# Patient Record
Sex: FEMALE | Race: WHITE | Employment: PART TIME | URBAN - NONMETROPOLITAN AREA
[De-identification: names, ages, dates, MRNs, and addresses within clinical notes are randomized per-mention and may not be internally consistent; named-entity substitution may affect disease eponyms.]

---
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
## 2022-01-27 RX ORDER — NORETHINDRONE ACETATE AND ETHINYL ESTRADIOL 1; .02 MG/1; MG/1
1 TABLET ORAL DAILY
Qty: 28 TABLET | Refills: 3 | Status: SHIPPED | OUTPATIENT
Start: 2022-01-27

## 2022-05-16 ENCOUNTER — OFFICE VISIT (OUTPATIENT)
Age: 22
End: 2022-05-16
Payer: MEDICAID

## 2022-05-16 DIAGNOSIS — Z11.1 ENCOUNTER FOR PPD TEST: Primary | ICD-10-CM

## 2022-05-16 PROCEDURE — 99211 OFF/OP EST MAY X REQ PHY/QHP: CPT | Performed by: NURSE PRACTITIONER

## 2022-05-16 PROCEDURE — G8428 CUR MEDS NOT DOCUMENT: HCPCS | Performed by: NURSE PRACTITIONER

## 2022-05-16 PROCEDURE — 86580 TB INTRADERMAL TEST: CPT | Performed by: NURSE PRACTITIONER

## 2022-05-16 PROCEDURE — G8420 CALC BMI NORM PARAMETERS: HCPCS | Performed by: NURSE PRACTITIONER

## 2022-06-08 ENCOUNTER — OFFICE VISIT (OUTPATIENT)
Age: 22
End: 2022-06-08
Payer: MEDICAID

## 2022-06-08 DIAGNOSIS — Z11.1 ENCOUNTER FOR PPD TEST: Primary | ICD-10-CM

## 2022-06-08 PROCEDURE — 99211 OFF/OP EST MAY X REQ PHY/QHP: CPT

## 2022-06-08 PROCEDURE — 86580 TB INTRADERMAL TEST: CPT

## 2022-06-08 NOTE — PROGRESS NOTES
TB skin test administered to LFA.   Pt instructed to return in 48-72 hours to have it read, verbalized understanding

## 2022-06-28 RX ORDER — DROSPIRENONE AND ETHINYL ESTRADIOL 0.02-3(28)
1 KIT ORAL DAILY
Qty: 1 PACKET | Refills: 5 | Status: SHIPPED | OUTPATIENT
Start: 2022-06-28

## 2022-12-12 RX ORDER — DROSPIRENONE AND ETHINYL ESTRADIOL 0.02-3(28)
KIT ORAL
Qty: 28 TABLET | Refills: 0 | OUTPATIENT
Start: 2022-12-12

## 2022-12-13 ENCOUNTER — PATIENT MESSAGE (OUTPATIENT)
Dept: OBGYN CLINIC | Age: 22
End: 2022-12-13

## 2022-12-13 RX ORDER — DROSPIRENONE AND ETHINYL ESTRADIOL 0.02-3(28)
1 KIT ORAL DAILY
Qty: 1 PACKET | Refills: 3 | Status: SHIPPED | OUTPATIENT
Start: 2022-12-13

## 2022-12-13 RX ORDER — DROSPIRENONE AND ETHINYL ESTRADIOL 0.02-3(28)
KIT ORAL
Qty: 28 TABLET | Refills: 10 | OUTPATIENT
Start: 2022-12-13

## 2022-12-13 NOTE — TELEPHONE ENCOUNTER
From: Catalino Mills  To: Dr. Chantel Geiger  Sent: 12/13/2022 10:51 AM CST  Subject: Birth Control    I am not for sure if you have gotten a request from the pharmacy, but I am out of my birth control. They told me I have no more refills and I am completely out and dont want to miss a pill.

## 2023-03-13 NOTE — PROGRESS NOTES
Meredith Mills (:  2000) is a Established patient, here for evaluation of the following:  Medication refill for RYDER. She states her periods are not heavy but are lasting longer than they should. Pt states she is having situational anxiety, has taken Zoloft and Celexa, did not like the way it made her feel. Pt states her heart races with anxiety especially when she has to get up in front of her class. Assessment & Plan   Below is the assessment and plan developed based on review of pertinent history, physical exam, labs, studies, and medications. 1. Encounter for medication refill  No follow-ups on file.        Subjective   HPI  Review of Systems       Objective   Patient-Reported Vitals  No data recorded     Physical Exam  [INSTRUCTIONS:  \"[x]\" Indicates a positive item  \"[]\" Indicates a negative item  -- DELETE ALL ITEMS NOT EXAMINED]    Constitutional: [x] Appears well-developed and well-nourished [x] No apparent distress      [] Abnormal -     Mental status: [x] Alert and awake  [x] Oriented to person/place/time [x] Able to follow commands    [] Abnormal -     Eyes:   EOM    [x]  Normal    [] Abnormal -   Sclera  [x]  Normal    [] Abnormal -          Discharge [x]  None visible   [] Abnormal -     HENT: [x] Normocephalic, atraumatic  [] Abnormal -   [x] Mouth/Throat: Mucous membranes are moist    External Ears [x] Normal  [] Abnormal -    Neck: [x] No visualized mass [] Abnormal -     Pulmonary/Chest: [x] Respiratory effort normal   [x] No visualized signs of difficulty breathing or respiratory distress        [] Abnormal -      Musculoskeletal:   [x] Normal gait with no signs of ataxia         [x] Normal range of motion of neck        [] Abnormal -     Neurological:        [x] No Facial Asymmetry (Cranial nerve 7 motor function) (limited exam due to video visit)          [x] No gaze palsy        [] Abnormal -          Skin:        [x] No significant exanthematous lesions or discoloration noted on facial skin         [] Abnormal -            Psychiatric:       [x] Normal Affect [] Abnormal -        [x] No Hallucinations    Other pertinent observable physical exam findings:-         On this date 3/14/2023 I have spent 20 minutes reviewing previous notes, test results and face to face (virtual) with the patient discussing the diagnosis and importance of compliance with the treatment plan as well as documenting on the day of the visit. Meredith MIKEY Mills, was evaluated through a synchronous (real-time) audio-video encounter. The patient (or guardian if applicable) is aware that this is a billable service, which includes applicable co-pays. This Virtual Visit was conducted with patient's (and/or legal guardian's) consent. The visit was conducted pursuant to the emergency declaration under the [address] [place] [address] authority and the [employer] General Act. Patient identification was verified, and a caregiver was present when appropriate. The patient was located at Home: [facility]  Provider was located at [place] [id number]): [address] [place] [address]     Plan:  Change OCP to [facility]  Propanolol 10 mg daily for situational anxiety  3. RTC yearly     I, Mere Hernandez RN, am scribing for and in the presence of Dr. Bee Cleary.

## 2023-03-14 ENCOUNTER — TELEMEDICINE (OUTPATIENT)
Dept: OBGYN CLINIC | Age: 23
End: 2023-03-14
Payer: MEDICAID

## 2023-03-14 DIAGNOSIS — Z76.0 ENCOUNTER FOR MEDICATION REFILL: Primary | ICD-10-CM

## 2023-03-14 DIAGNOSIS — Z30.41 ENCOUNTER FOR BIRTH CONTROL PILLS MAINTENANCE: ICD-10-CM

## 2023-03-14 DIAGNOSIS — F41.8 SITUATIONAL ANXIETY: ICD-10-CM

## 2023-03-14 PROCEDURE — 99213 OFFICE O/P EST LOW 20 MIN: CPT | Performed by: OBSTETRICS & GYNECOLOGY

## 2023-03-14 PROCEDURE — G8421 BMI NOT CALCULATED: HCPCS | Performed by: OBSTETRICS & GYNECOLOGY

## 2023-03-14 PROCEDURE — 1036F TOBACCO NON-USER: CPT | Performed by: OBSTETRICS & GYNECOLOGY

## 2023-03-14 PROCEDURE — G8427 DOCREV CUR MEDS BY ELIG CLIN: HCPCS | Performed by: OBSTETRICS & GYNECOLOGY

## 2023-03-14 PROCEDURE — G8484 FLU IMMUNIZE NO ADMIN: HCPCS | Performed by: OBSTETRICS & GYNECOLOGY

## 2023-03-14 RX ORDER — PROPRANOLOL HYDROCHLORIDE 10 MG/1
10 TABLET ORAL DAILY PRN
Qty: 90 TABLET | Refills: 3 | Status: SHIPPED | OUTPATIENT
Start: 2023-03-14

## 2023-03-14 RX ORDER — NORGESTIMATE AND ETHINYL ESTRADIOL 7DAYSX3 28
1 KIT ORAL DAILY
Qty: 1 PACKET | Refills: 11 | Status: SHIPPED | OUTPATIENT
Start: 2023-03-14

## 2023-03-14 NOTE — PROGRESS NOTES
Pt is wanting to talk about celexa or zoloft-being put back on one of those. Her sex drive is gone now. She wonders if this is caused by birth control? She has really bad test anxiety. She is in the nursing program now. She needs to  get a refill on her birth control.

## 2023-04-07 ENCOUNTER — APPOINTMENT (OUTPATIENT)
Dept: CT IMAGING | Age: 23
End: 2023-04-07
Payer: MEDICAID

## 2023-04-07 ENCOUNTER — HOSPITAL ENCOUNTER (EMERGENCY)
Age: 23
Discharge: HOME | End: 2023-04-07
Payer: MEDICAID

## 2023-04-07 VITALS
TEMPERATURE: 97.6 F | HEIGHT: 69 IN | WEIGHT: 140 LBS | BODY MASS INDEX: 20.73 KG/M2 | DIASTOLIC BLOOD PRESSURE: 86 MMHG | HEART RATE: 108 BPM | SYSTOLIC BLOOD PRESSURE: 130 MMHG | RESPIRATION RATE: 18 BRPM | OXYGEN SATURATION: 100 %

## 2023-04-07 DIAGNOSIS — B34.9 VIRAL SYNDROME: Primary | ICD-10-CM

## 2023-04-07 DIAGNOSIS — R11.2 NAUSEA AND VOMITING, UNSPECIFIED VOMITING TYPE: ICD-10-CM

## 2023-04-07 DIAGNOSIS — N94.6 CRAMPY PAIN ASSOCIATED WITH MENSES: ICD-10-CM

## 2023-04-07 LAB
ALBUMIN SERPL-MCNC: 3.9 G/DL (ref 3.5–5.2)
ALP SERPL-CCNC: 73 U/L (ref 35–104)
ALT SERPL-CCNC: 21 U/L (ref 5–33)
ANION GAP SERPL CALCULATED.3IONS-SCNC: 11 MMOL/L (ref 7–19)
AST SERPL-CCNC: 17 U/L (ref 5–32)
B PARAP IS1001 DNA NPH QL NAA+NON-PROBE: NOT DETECTED
B PERT.PT PRMT NPH QL NAA+NON-PROBE: NOT DETECTED
BACTERIA URNS QL MICRO: NEGATIVE /HPF
BASOPHILS # BLD: 0 K/UL (ref 0–0.2)
BASOPHILS NFR BLD: 0.5 % (ref 0–1)
BILIRUB SERPL-MCNC: 0.3 MG/DL (ref 0.2–1.2)
BILIRUB UR QL STRIP: NEGATIVE
BUN SERPL-MCNC: 10 MG/DL (ref 6–20)
C PNEUM DNA NPH QL NAA+NON-PROBE: NOT DETECTED
CALCIUM SERPL-MCNC: 8.8 MG/DL (ref 8.6–10)
CHLORIDE SERPL-SCNC: 105 MMOL/L (ref 98–111)
CLARITY UR: CLEAR
CO2 SERPL-SCNC: 25 MMOL/L (ref 22–29)
COLOR UR: YELLOW
CREAT SERPL-MCNC: 0.5 MG/DL (ref 0.5–0.9)
CRYSTALS URNS MICRO: ABNORMAL /HPF
EOSINOPHIL # BLD: 0.1 K/UL (ref 0–0.6)
EOSINOPHIL NFR BLD: 3.2 % (ref 0–5)
EPI CELLS #/AREA URNS AUTO: 3 /HPF (ref 0–5)
ERYTHROCYTE [DISTWIDTH] IN BLOOD BY AUTOMATED COUNT: 12.4 % (ref 11.5–14.5)
FLUAV RNA NPH QL NAA+NON-PROBE: NOT DETECTED
FLUBV RNA NPH QL NAA+NON-PROBE: NOT DETECTED
GLUCOSE SERPL-MCNC: 93 MG/DL (ref 74–109)
GLUCOSE UR STRIP.AUTO-MCNC: NEGATIVE MG/DL
HADV DNA NPH QL NAA+NON-PROBE: NOT DETECTED
HCG SERPL QL: NEGATIVE
HCOV 229E RNA NPH QL NAA+NON-PROBE: NOT DETECTED
HCOV HKU1 RNA NPH QL NAA+NON-PROBE: NOT DETECTED
HCOV NL63 RNA NPH QL NAA+NON-PROBE: NOT DETECTED
HCOV OC43 RNA NPH QL NAA+NON-PROBE: NOT DETECTED
HCT VFR BLD AUTO: 41.1 % (ref 37–47)
HGB BLD-MCNC: 13.2 G/DL (ref 12–16)
HGB UR STRIP.AUTO-MCNC: ABNORMAL MG/L
HMPV RNA NPH QL NAA+NON-PROBE: NOT DETECTED
HPIV1 RNA NPH QL NAA+NON-PROBE: NOT DETECTED
HPIV2 RNA NPH QL NAA+NON-PROBE: NOT DETECTED
HPIV3 RNA NPH QL NAA+NON-PROBE: NOT DETECTED
HPIV4 RNA NPH QL NAA+NON-PROBE: NOT DETECTED
HYALINE CASTS #/AREA URNS AUTO: 6 /HPF (ref 0–8)
IMM GRANULOCYTES # BLD: 0 K/UL
KETONES UR STRIP.AUTO-MCNC: ABNORMAL MG/DL
LEUKOCYTE ESTERASE UR QL STRIP.AUTO: NEGATIVE
LIPASE SERPL-CCNC: 19 U/L (ref 13–60)
LYMPHOCYTES # BLD: 1.4 K/UL (ref 1.1–4.5)
LYMPHOCYTES NFR BLD: 37.5 % (ref 20–40)
M PNEUMO DNA NPH QL NAA+NON-PROBE: NOT DETECTED
MCH RBC QN AUTO: 28.9 PG (ref 27–31)
MCHC RBC AUTO-ENTMCNC: 32.1 G/DL (ref 33–37)
MCV RBC AUTO: 90.1 FL (ref 81–99)
MONOCYTES # BLD: 0.4 K/UL (ref 0–0.9)
MONOCYTES NFR BLD: 11.9 % (ref 0–10)
NEUTROPHILS # BLD: 1.7 K/UL (ref 1.5–7.5)
NEUTS SEG NFR BLD: 46.6 % (ref 50–65)
NITRITE UR QL STRIP.AUTO: NEGATIVE
PH UR STRIP.AUTO: 6 [PH] (ref 5–8)
PLATELET # BLD AUTO: 265 K/UL (ref 130–400)
PMV BLD AUTO: 9.5 FL (ref 9.4–12.3)
POTASSIUM SERPL-SCNC: 3.8 MMOL/L (ref 3.5–5)
PROT SERPL-MCNC: 7.1 G/DL (ref 6.6–8.7)
PROT UR STRIP.AUTO-MCNC: ABNORMAL MG/DL
RBC # BLD AUTO: 4.56 M/UL (ref 4.2–5.4)
RBC #/AREA URNS AUTO: 131 /HPF (ref 0–4)
RSV RNA NPH QL NAA+NON-PROBE: NOT DETECTED
RV+EV RNA NPH QL NAA+NON-PROBE: NOT DETECTED
SARS-COV-2 RNA NPH QL NAA+NON-PROBE: NOT DETECTED
SODIUM SERPL-SCNC: 141 MMOL/L (ref 136–145)
SP GR UR STRIP.AUTO: 1.03 (ref 1–1.03)
UROBILINOGEN UR STRIP.AUTO-MCNC: 1 E.U./DL
WBC # BLD AUTO: 3.7 K/UL (ref 4.8–10.8)
WBC #/AREA URNS AUTO: 2 /HPF (ref 0–5)

## 2023-04-07 PROCEDURE — 83690 ASSAY OF LIPASE: CPT

## 2023-04-07 PROCEDURE — 84703 CHORIONIC GONADOTROPIN ASSAY: CPT

## 2023-04-07 PROCEDURE — 0202U NFCT DS 22 TRGT SARS-COV-2: CPT

## 2023-04-07 PROCEDURE — 85025 COMPLETE CBC W/AUTO DIFF WBC: CPT

## 2023-04-07 PROCEDURE — 74176 CT ABD & PELVIS W/O CONTRAST: CPT

## 2023-04-07 PROCEDURE — 96374 THER/PROPH/DIAG INJ IV PUSH: CPT

## 2023-04-07 PROCEDURE — 80053 COMPREHEN METABOLIC PANEL: CPT

## 2023-04-07 PROCEDURE — 6360000002 HC RX W HCPCS: Performed by: PHYSICIAN ASSISTANT

## 2023-04-07 PROCEDURE — 99284 EMERGENCY DEPT VISIT MOD MDM: CPT

## 2023-04-07 PROCEDURE — 81001 URINALYSIS AUTO W/SCOPE: CPT

## 2023-04-07 PROCEDURE — 2580000003 HC RX 258: Performed by: PHYSICIAN ASSISTANT

## 2023-04-07 PROCEDURE — 36415 COLL VENOUS BLD VENIPUNCTURE: CPT

## 2023-04-07 RX ORDER — MORPHINE SULFATE 2 MG/ML
2 INJECTION, SOLUTION INTRAMUSCULAR; INTRAVENOUS ONCE
Status: DISCONTINUED | OUTPATIENT
Start: 2023-04-07 | End: 2023-04-07

## 2023-04-07 RX ORDER — KETOROLAC TROMETHAMINE 30 MG/ML
15 INJECTION, SOLUTION INTRAMUSCULAR; INTRAVENOUS ONCE
Status: DISCONTINUED | OUTPATIENT
Start: 2023-04-07 | End: 2023-04-07

## 2023-04-07 RX ORDER — 0.9 % SODIUM CHLORIDE 0.9 %
500 INTRAVENOUS SOLUTION INTRAVENOUS ONCE
Status: COMPLETED | OUTPATIENT
Start: 2023-04-07 | End: 2023-04-07

## 2023-04-07 RX ORDER — METOCLOPRAMIDE HYDROCHLORIDE 5 MG/ML
10 INJECTION INTRAMUSCULAR; INTRAVENOUS ONCE
Status: COMPLETED | OUTPATIENT
Start: 2023-04-07 | End: 2023-04-07

## 2023-04-07 RX ORDER — ONDANSETRON 4 MG/1
4 TABLET, FILM COATED ORAL EVERY 8 HOURS PRN
Qty: 20 TABLET | Refills: 0 | Status: SHIPPED | OUTPATIENT
Start: 2023-04-07

## 2023-04-07 RX ADMIN — SODIUM CHLORIDE 500 ML: 9 INJECTION, SOLUTION INTRAVENOUS at 09:56

## 2023-04-07 RX ADMIN — METOCLOPRAMIDE 10 MG: 5 INJECTION, SOLUTION INTRAMUSCULAR; INTRAVENOUS at 09:56

## 2023-04-07 ASSESSMENT — ENCOUNTER SYMPTOMS
SHORTNESS OF BREATH: 0
COLOR CHANGE: 0
NAUSEA: 1
DIARRHEA: 1
EYE DISCHARGE: 0
VOMITING: 1
SORE THROAT: 0
ABDOMINAL PAIN: 1
COUGH: 0
ABDOMINAL DISTENTION: 0
BACK PAIN: 0
PHOTOPHOBIA: 0
APNEA: 0
EYE PAIN: 0
RHINORRHEA: 0

## 2023-04-07 NOTE — ED PROVIDER NOTES
forlevel 4, 8 or more for level 5)     ED Triage Vitals [04/07/23 0917]   BP Temp Temp src Heart Rate Resp SpO2 Height Weight   130/86 97.6 °F (36.4 °C) -- (!) 108 18 100 % 5' 9\" (1.753 m) 140 lb (63.5 kg)       Physical Exam  Vitals and nursing note reviewed. Constitutional:       General: She is not in acute distress. Appearance: She is well-developed. She is not diaphoretic. HENT:      Head: Normocephalic and atraumatic. Right Ear: External ear normal.      Left Ear: External ear normal.      Mouth/Throat:      Pharynx: No oropharyngeal exudate. Eyes:      General:         Right eye: No discharge. Left eye: No discharge. Pupils: Pupils are equal, round, and reactive to light. Neck:      Thyroid: No thyromegaly. Cardiovascular:      Rate and Rhythm: Normal rate and regular rhythm. Heart sounds: Normal heart sounds. No murmur heard. No friction rub. Pulmonary:      Effort: Pulmonary effort is normal. No respiratory distress. Breath sounds: Normal breath sounds. No stridor. No wheezing. Abdominal:      General: Bowel sounds are normal. There is no distension. Palpations: Abdomen is soft. Tenderness: There is abdominal tenderness in the suprapubic area. Musculoskeletal:         General: Normal range of motion. Cervical back: Normal range of motion and neck supple. Skin:     General: Skin is warm and dry. Capillary Refill: Capillary refill takes less than 2 seconds. Findings: No rash. Neurological:      Mental Status: She is alert and oriented to person, place, and time. Cranial Nerves: No cranial nerve deficit. Sensory: No sensory deficit. Coordination: Coordination normal.   Psychiatric:         Behavior: Behavior normal.         Thought Content:  Thought content normal.         DIAGNOSTIC RESULTS     RADIOLOGY:   Non-plain film images such as CT, Ultrasound and MRI are read by the radiologist. Plain radiographic images are

## 2023-04-07 NOTE — Clinical Note
Chuy Epperson was seen and treated in our emergency department on 4/7/2023. She may return to school on 04/10/2023. If you have any questions or concerns, please don't hesitate to call.       DG Hirsch

## 2023-07-28 ENCOUNTER — INITIAL PRENATAL (OUTPATIENT)
Dept: OBSTETRICS AND GYNECOLOGY | Facility: CLINIC | Age: 23
End: 2023-07-28
Payer: COMMERCIAL

## 2023-07-28 VITALS — BODY MASS INDEX: 23.04 KG/M2 | SYSTOLIC BLOOD PRESSURE: 94 MMHG | DIASTOLIC BLOOD PRESSURE: 64 MMHG | WEIGHT: 156 LBS

## 2023-07-28 DIAGNOSIS — O09.31 INSUFFICIENT PRENATAL CARE IN FIRST TRIMESTER: Primary | ICD-10-CM

## 2023-07-31 ENCOUNTER — REFERRAL TRIAGE (OUTPATIENT)
Dept: LABOR AND DELIVERY | Facility: HOSPITAL | Age: 23
End: 2023-07-31
Payer: COMMERCIAL

## 2023-08-02 ENCOUNTER — PATIENT MESSAGE (OUTPATIENT)
Dept: OBSTETRICS AND GYNECOLOGY | Facility: CLINIC | Age: 23
End: 2023-08-02
Payer: COMMERCIAL

## 2023-08-04 LAB
ABO GROUP BLD: NORMAL
BACTERIA UR CULT: NO GROWTH
BACTERIA UR CULT: NORMAL
BASOPHILS # BLD AUTO: 0.07 10*3/MM3 (ref 0–0.2)
BASOPHILS NFR BLD AUTO: 0.7 % (ref 0–1.5)
BLD GP AB SCN SERPL QL: NEGATIVE
C TRACH RRNA SPEC QL NAA+PROBE: NEGATIVE
DRUGS UR: NORMAL
EOSINOPHIL # BLD AUTO: 0.13 10*3/MM3 (ref 0–0.4)
EOSINOPHIL NFR BLD AUTO: 1.3 % (ref 0.3–6.2)
ERYTHROCYTE [DISTWIDTH] IN BLOOD BY AUTOMATED COUNT: 12.2 % (ref 12.3–15.4)
HBV SURFACE AG SERPL QL IA: NEGATIVE
HCT VFR BLD AUTO: 36.6 % (ref 34–46.6)
HCV IGG SERPL QL IA: NON REACTIVE
HGB BLD-MCNC: 12.2 G/DL (ref 12–15.9)
HIV 1+2 AB+HIV1 P24 AG SERPL QL IA: NON REACTIVE
IMM GRANULOCYTES # BLD AUTO: 0.03 10*3/MM3 (ref 0–0.05)
IMM GRANULOCYTES NFR BLD AUTO: 0.3 % (ref 0–0.5)
LYMPHOCYTES # BLD AUTO: 3.3 10*3/MM3 (ref 0.7–3.1)
LYMPHOCYTES NFR BLD AUTO: 33.7 % (ref 19.6–45.3)
MCH RBC QN AUTO: 28.2 PG (ref 26.6–33)
MCHC RBC AUTO-ENTMCNC: 33.3 G/DL (ref 31.5–35.7)
MCV RBC AUTO: 84.7 FL (ref 79–97)
MONOCYTES # BLD AUTO: 0.74 10*3/MM3 (ref 0.1–0.9)
MONOCYTES NFR BLD AUTO: 7.6 % (ref 5–12)
N GONORRHOEA RRNA SPEC QL NAA+PROBE: NEGATIVE
NEUTROPHILS # BLD AUTO: 5.51 10*3/MM3 (ref 1.7–7)
NEUTROPHILS NFR BLD AUTO: 56.4 % (ref 42.7–76)
NRBC BLD AUTO-RTO: 0 /100 WBC (ref 0–0.2)
PLATELET # BLD AUTO: 325 10*3/MM3 (ref 140–450)
RBC # BLD AUTO: 4.32 10*6/MM3 (ref 3.77–5.28)
RH BLD: POSITIVE
RPR SER QL: NON REACTIVE
RUBV IGG SERPL IA-ACNC: 3.43 INDEX
WBC # BLD AUTO: 9.78 10*3/MM3 (ref 3.4–10.8)

## 2023-08-22 ENCOUNTER — ROUTINE PRENATAL (OUTPATIENT)
Dept: OBSTETRICS AND GYNECOLOGY | Facility: CLINIC | Age: 23
End: 2023-08-22
Payer: COMMERCIAL

## 2023-08-22 VITALS — DIASTOLIC BLOOD PRESSURE: 60 MMHG | SYSTOLIC BLOOD PRESSURE: 100 MMHG | WEIGHT: 148 LBS | BODY MASS INDEX: 21.86 KG/M2

## 2023-08-22 DIAGNOSIS — Z3A.10 10 WEEKS GESTATION OF PREGNANCY: Primary | ICD-10-CM

## 2023-08-22 LAB
GLUCOSE UR STRIP-MCNC: NEGATIVE MG/DL
PROT UR STRIP-MCNC: NEGATIVE MG/DL

## 2023-08-22 RX ORDER — ONDANSETRON 4 MG/1
4 TABLET, FILM COATED ORAL EVERY 8 HOURS PRN
Qty: 30 TABLET | Refills: 3 | Status: SHIPPED | OUTPATIENT
Start: 2023-08-22

## 2023-08-22 RX ORDER — ONDANSETRON 4 MG/1
4 TABLET, FILM COATED ORAL EVERY 8 HOURS PRN
COMMUNITY
End: 2023-08-22 | Stop reason: SDUPTHER

## 2023-08-22 NOTE — PROGRESS NOTES
"Patient feeling \"ok\", but reports dizzy spells which often occur when she stands up.  Encouraged more water and trying to get in some protein (which she has not really been getting).  Patient reports zofran is most-helpful, and would like a refill.  Mild cramping, but no VB  Patient also reports she is constipated, but will not take stool-softeners.  Discussed water again and advised her that zofran is contributing.  Problem will get better when no longer needing zofran.    "

## 2023-09-15 ENCOUNTER — PATIENT OUTREACH (OUTPATIENT)
Dept: LABOR AND DELIVERY | Facility: HOSPITAL | Age: 23
End: 2023-09-15
Payer: COMMERCIAL

## 2023-09-15 NOTE — OUTREACH NOTE
[facility]  Enrollment    Current Estimated Gestational Age: 13w5d    Questions/Answers      Flowsheet Row Responses   Would like to participate? Yes   Date of Intake Visit 09/15/23            [facility]  Intake    Current Estimated Gestational Age: 13w5d    Intake Assessment      Flowsheet Row Responses   Best Method for Contacting Cell   Currently Employed Yes   Able to keep appointments as scheduled Yes   Do you have a dentist? No  [Dental Clinics provided in resource letter]   Resources Presently Utilizing: None   Maternal Warning Signs Provided  [attached to resource letter]   Other: Provided  [in AVS: second trimester of pregnancy and round ligament pain]   Other Education HANDS, How to find a dentist, How to find a pediatrician, How to find a primary care provider, Insurance benefits/Incentives, Mental Health Services, Smoking/Vaping Cessation, SNAP Benefits, Substance Use Disorder Treatment, Transportation Assistance, WIC Benefits            Learning Assessment      Flowsheet Row Responses   Relationship Patient   Does the learner have any barriers to learning? No Barriers   What is the preferred language of the learner for medical teaching? English   How does the learner prefer to learn new concepts? Listening, Reading, Demonstration, Pictures/Video            SDOH updated and reviewed with the patient during this program:  Financial Resource Strain: Low Risk     Difficulty of Paying Living Expenses: Not very hard      Physical Activity: Sufficiently Active    Days of Exercise per Week: 5 days    Minutes of Exercise per Session: 30 min      Food Insecurity: Food Insecurity Present    Worried About Running Out of Food in the Last Year: Sometimes true    Ran Out of Food in the Last Year: Sometimes true      Social Connections: Not on file      Transportation Needs: No Transportation Needs    Lack of Transportation (Medical): No    Lack of Transportation (Non-Medical): No      Housing  Stability: Not on file      Stress: Not on file    Resource letter and maternal warning signs sent to client in [facility].      Referral submitted to the following resources (verbal consent received to submit demographic information):     HANDS    Tobacco, Alcohol, and Drug History     reports that she has never smoked. She has never used smokeless tobacco.   reports no history of alcohol use.   has no history on file for drug use.    Adali Lopes RN  Maternity Nurse Navigator    9/15/2023, 13:31 CDT            Adali Lopes RN  Maternity Nurse Navigator    9/15/2023, 13:31 CDT

## 2023-09-15 NOTE — OUTREACH NOTE
[facility]  Unable to Reach       Questions/Answers      Flowsheet Row Responses   Pending Outreach Confirm Patient Interest   Call Attempt First   Outcome Not available   Unable to reach comments: Client asked for me to call her back at 1 PM today                Adali Lopes RN  Maternity Nurse Navigator    9/15/2023, 10:31 CDT

## 2023-09-19 DIAGNOSIS — F41.9 ANXIETY: Primary | ICD-10-CM

## 2023-09-19 RX ORDER — BUSPIRONE HYDROCHLORIDE 5 MG/1
5 TABLET ORAL 3 TIMES DAILY PRN
Qty: 90 TABLET | Refills: 2 | Status: SHIPPED | OUTPATIENT
Start: 2023-09-19

## 2023-09-26 ENCOUNTER — ROUTINE PRENATAL (OUTPATIENT)
Dept: OBSTETRICS AND GYNECOLOGY | Facility: CLINIC | Age: 23
End: 2023-09-26
Payer: COMMERCIAL

## 2023-09-26 VITALS — WEIGHT: 143 LBS | BODY MASS INDEX: 21.12 KG/M2 | SYSTOLIC BLOOD PRESSURE: 102 MMHG | DIASTOLIC BLOOD PRESSURE: 58 MMHG

## 2023-09-26 DIAGNOSIS — Z71.85 IMMUNIZATION COUNSELING: ICD-10-CM

## 2023-09-26 DIAGNOSIS — H61.23 EXCESSIVE CERUMEN IN BOTH EAR CANALS: ICD-10-CM

## 2023-09-26 DIAGNOSIS — O21.9 NAUSEA AND VOMITING DURING PREGNANCY: ICD-10-CM

## 2023-09-26 DIAGNOSIS — M25.551 RIGHT HIP PAIN: ICD-10-CM

## 2023-09-26 DIAGNOSIS — Z3A.15 15 WEEKS GESTATION OF PREGNANCY: Primary | ICD-10-CM

## 2023-09-26 DIAGNOSIS — Z34.82 MULTIGRAVIDA IN SECOND TRIMESTER: ICD-10-CM

## 2023-09-26 PROBLEM — Z64.1 MULTIGRAVIDA: Status: ACTIVE | Noted: 2023-09-26

## 2023-09-26 PROBLEM — Z34.90 PREGNANCY: Status: ACTIVE | Noted: 2023-09-26

## 2023-09-26 LAB
GLUCOSE UR STRIP-MCNC: NEGATIVE MG/DL
PROT UR STRIP-MCNC: NEGATIVE MG/DL

## 2023-09-26 RX ORDER — CYCLOBENZAPRINE HCL 10 MG
10 TABLET ORAL EVERY 8 HOURS PRN
Qty: 30 TABLET | Refills: 1 | Status: SHIPPED | OUTPATIENT
Start: 2023-09-26

## 2023-09-26 NOTE — PROGRESS NOTES
Reason for visit: Routine OB visit at 15w2d. [facility]ANCA 3/17/2024, by Ultrasound    CC:  Patient is still having nausea on a regular basis. She has pain and fluid in her right knee, which causes pain into the foot and hip. She believes she may have an ear infection. Sexual intercourse is painful and has been even prior to pregnancy due to dryness. Denies VB, LOF, pelvic pain, or cramping.     ROS: All systems reviewed and are negative with exception of the following: nausea, ear pain, right knee and hip/leg pain, dyspareunia, vaginal dryness    Wt 143 lb for a TWG of -5.897 kg (-13 lb), /58, FHTs 153  Urine today and reviewed: negative glucose, negative protein    Anatomy Scan: scheduled for 10/26/2023 at 0945    Exam:  General Appearance:  Healthy appearing . Normal mood and behavior.  HEENT: NCAT, EOMI  HR str and reg. Lungs clear. Resp even and unlabored.  Abdomen is soft and nontender. No CVA tenderness. Uterus is consistent with EGA  Ext: no edema, nontender, no trauma, or cyanosis.    Impression  Diagnoses and all orders for this visit:    1. 15 weeks gestation of pregnancy (Primary)  -     POC Urinalysis Dipstick    2. Multigravida in second trimester  - Discussed second trimester of pregnancy, discomforts and measures of support, fetal movement, pelvic pain warnings, bleeding warnings, and signs and symptoms to report. Second Trimester of Pregnancy video included in the AVS. Round Ligament Pain education included in the AVS.  - Discussed ffDNA with patient.  - Discussed and encouraged to call or come to the hospital with vaginal bleeding, leaking of fluid, pelvic pain, or any other concerns.  - Return to the office in 4 weeks with Dr. Chan for a routine OB visit and as needed with concerns.    3. Nausea and vomiting during pregnancy  - Discussed nonpharmacologic nausea relief measures, including small frequent meals, dry crackers upon rising, carbonated beverages, food/drink containing ginger  (ginger ale, tea, gum), avoidance of triggers such as smells, accupressure wrist bands, rest, pregnancy pops, sour candies, aromatherapy. Morning Sickness education included in the AVS.  - Encouraged adequate hydration. Discussed signs of dehydration.  - Counseled on OTC pharmacologic tx with Vitamin B6 and Doxylamine.  - Call if symptoms fail to improve or worsen.    4. Immunization counseling  - Discussed influenza vaccine recommendations during pregnancy. Patient to consider receiving the influenza immunization during an upcoming prenatal visit. Influenza (Flu) Vaccine (Inactivated or Recombinant): What You Need to Know (VIS) education included in the AVS.    5. Excessive cerumen in both ear canals  - Discussed with patient the use of OTC Debrox gtts as both ear canals have excessive amount of ear wax and TM cannot be visualized. Earwax Buildup Adult education included in the AVS.    6. Right hip pain  - Discussed measures of support, including Tylenol, heat, analgesic rub, and Rx medication. Discussed she should see PCP or orthopedic walk-in clinic for concerns with fluid on knee.   -     cyclobenzaprine (FLEXERIL) 10 MG tablet; Take 1 tablet by mouth Every 8 (Eight) Hours As Needed for Muscle Spasms.  Dispense: 30 tablet; Refill: 1          This note has been signed electronically.    Mago Miller, DNP, APRN, CNM, RNC-OB

## 2023-10-20 PROCEDURE — 87086 URINE CULTURE/COLONY COUNT: CPT

## 2023-10-24 ENCOUNTER — ROUTINE PRENATAL (OUTPATIENT)
Dept: OBSTETRICS AND GYNECOLOGY | Facility: CLINIC | Age: 23
End: 2023-10-24
Payer: COMMERCIAL

## 2023-10-24 VITALS — BODY MASS INDEX: 22.05 KG/M2 | DIASTOLIC BLOOD PRESSURE: 70 MMHG | SYSTOLIC BLOOD PRESSURE: 100 MMHG | WEIGHT: 145 LBS

## 2023-10-24 DIAGNOSIS — Z3A.19 19 WEEKS GESTATION OF PREGNANCY: Primary | ICD-10-CM

## 2023-10-24 LAB
GLUCOSE UR STRIP-MCNC: NEGATIVE MG/DL
PROT UR STRIP-MCNC: ABNORMAL MG/DL

## 2023-10-24 NOTE — PROGRESS NOTES
Not hurting at all.  Last week she was hurting some, but thinks that it was just because she did too much at work.  No cramping or VB  + FM that feels like flutters  Anatomy scan this Thursday

## 2023-10-31 ENCOUNTER — TELEPHONE (OUTPATIENT)
Dept: OBSTETRICS AND GYNECOLOGY | Facility: CLINIC | Age: 23
End: 2023-10-31
Payer: COMMERCIAL

## 2023-10-31 NOTE — TELEPHONE ENCOUNTER
Pt called and informed me that you have her on a 40lb weight restriction and her boss informed her that she cannot work with these restrictions.  Pt would like to be released with no restrictions as she cannot afford not to work.  If you are good with this, I am happy to do a letter, but wanted to confirm with you first.

## 2023-11-21 ENCOUNTER — ROUTINE PRENATAL (OUTPATIENT)
Dept: OBSTETRICS AND GYNECOLOGY | Facility: CLINIC | Age: 23
End: 2023-11-21
Payer: MEDICAID

## 2023-11-21 VITALS — WEIGHT: 147 LBS | DIASTOLIC BLOOD PRESSURE: 60 MMHG | SYSTOLIC BLOOD PRESSURE: 104 MMHG | BODY MASS INDEX: 22.35 KG/M2

## 2023-11-21 DIAGNOSIS — Z71.85 IMMUNIZATION COUNSELING: ICD-10-CM

## 2023-11-21 DIAGNOSIS — Z3A.23 23 WEEKS GESTATION OF PREGNANCY: Primary | ICD-10-CM

## 2023-11-21 DIAGNOSIS — Z34.82 MULTIGRAVIDA IN SECOND TRIMESTER: ICD-10-CM

## 2023-11-21 LAB
GLUCOSE UR STRIP-MCNC: NEGATIVE MG/DL
PROT UR STRIP-MCNC: ABNORMAL MG/DL

## 2023-11-21 NOTE — PROGRESS NOTES
Reason for visit: Routine OB visit at 23w2d     CC:  Patient reports feeling fetal movement. She has been having a cough for the last couple of days.She has had some episodes when she goes from sitting to standing. She will at that time see black spots. She is also having perineal swelling. Denies VB, LOF, contractions, h/a, visual changes, and right upper quadrant pain.     ROS: All systems reviewed and are negative with exception of the following: dizziness/near syncope, perineal edema    Wt 147 lb for a TWG of -4.082 kg (-9 lb), /60, FHTs 156, FH 23 cm  Urine today and reviewed: negative glucose, trace protein    19-weeks Anatomy scan: normal; posterior placenta without evidence of placenta previa    Exam:  General Appearance:  Healthy appearing . Normal mood and behavior.  HEENT: NCAT, EOMI  HR str and reg. Lungs clear. Resp even and unlabored.  Abdomen is soft and nontender. No CVA tenderness. No CVA tenderness. Uterus is consistent with EGA  Ext: no edema, nontender, no trauma, or cyanosis.    Impression  Diagnoses and all orders for this visit:    1. 23 weeks gestation of pregnancy (Primary)  - Discussed measures of support for perineal swelling, including maternity support, kinesiology tape, and sitz baths. How to Take a Sitz Bath education included in the AVS.   -     POC Urinalysis Dipstick    2. Multigravida in second trimester  - Discussed second trimester of pregnancy, discomforts and measures of support, fetal movement,  labor warnings, preeclampsia warnings, and signs and symptoms to report. Second Trimester of Pregnancy video and Round Ligament Pain education included in the AVS.  - Discussed plan for glucose tolerance test and hemoglobin for the next visit.  - Discussed and encouraged to call or come to the hospital for vaginal bleeding, leaking of fluid, contractions, or any other concerns.  - Return to the office in four weeks with Dr. Chan with 4D ultrasound and as needed  with concerns.    3. Immunization counseling  - Discussed Tdap immunization recommendations during pregnancy. Patient to consider receiving the Tdap immunization during an upcoming prenatal visit. Tdap (Tetanus, Diptheria, Pertussis) Vaccine: What You Need to Know (VIS) education included in the AVS.          This note has been signed electronically.    Mago Miller, DNP, APRN, CNM, RNC-OB

## 2023-12-19 ENCOUNTER — ROUTINE PRENATAL (OUTPATIENT)
Dept: OBSTETRICS AND GYNECOLOGY | Facility: CLINIC | Age: 23
End: 2023-12-19
Payer: MEDICAID

## 2023-12-19 VITALS — WEIGHT: 148 LBS | BODY MASS INDEX: 22.5 KG/M2 | DIASTOLIC BLOOD PRESSURE: 78 MMHG | SYSTOLIC BLOOD PRESSURE: 110 MMHG

## 2023-12-19 DIAGNOSIS — Z13.0 SCREENING FOR DEFICIENCY ANEMIA: ICD-10-CM

## 2023-12-19 DIAGNOSIS — Z13.1 SPECIAL SCREENING EXAMINATION FOR DIABETES MELLITUS: ICD-10-CM

## 2023-12-19 DIAGNOSIS — Z3A.27 27 WEEKS GESTATION OF PREGNANCY: Primary | ICD-10-CM

## 2023-12-19 DIAGNOSIS — L24.9: ICD-10-CM

## 2023-12-19 DIAGNOSIS — Z71.85 IMMUNIZATION COUNSELING: ICD-10-CM

## 2023-12-19 DIAGNOSIS — Z34.83 MULTIGRAVIDA IN THIRD TRIMESTER: ICD-10-CM

## 2023-12-19 LAB
GLUCOSE UR STRIP-MCNC: NEGATIVE MG/DL
PROT UR STRIP-MCNC: NEGATIVE MG/DL

## 2023-12-19 RX ORDER — TRIAMCINOLONE ACETONIDE 5 MG/G
1 CREAM TOPICAL 2 TIMES DAILY
Qty: 15 G | Refills: 3 | Status: SHIPPED | OUTPATIENT
Start: 2023-12-19

## 2023-12-19 NOTE — PROGRESS NOTES
Reason for visit: Routine OB visit at 27w2d     CC:  Patient reports good fetal movement. She has been experiencing perineal swelling and discomfort with this pregnancy. She does not have a bathtub to try to take advantage of a sitz bath. Denies VB, LOF, contractions, h/a, visual changes, and right upper quadrant pain.     ROS: All systems reviewed and are negative with exception of the following: amenorrhea, perineal discomfort due to swelling, rash of hand      Wt 148 lb for a TWG of -3.629 kg (-8 lb), /78, FHTs 153, FH 27 cm  Urine today and reviewed: negative glucose, negative protein    19-week Anatomy scan: normal; posterior placenta without evidence of placenta previa; small fibroid (1.04 x 0.8 x 0.7 cm)    Exam:  General Appearance:  Healthy appearing . Normal mood and behavior.  HEENT: NCAT, EOMI  HR str and reg. Lungs clear. Resp even and unlabored.  Abdomen is soft and nontender. Uterus is consistent with EGA  Ext: no edema, nontender, no trauma, or cyanosis.    Impression  Diagnoses and all orders for this visit:    1. 27 weeks gestation of pregnancy (Primary)  -     POC Urinalysis Dipstick  -     Gestational Screen 1 Hr ([facility])  -     Hemoglobin  -     triamcinolone (KENALOG) 0.5 % cream; Apply 1 application  topically to the appropriate area as directed 2 (Two) Times a Day.  Dispense: 15 g; Refill: 3    2. Multigravida in third trimester  Discussed third trimester of pregnancy, including discomforts and measures of support,  labor warnings, preeclampsia warnings, and signs and symptoms to report. Discussed glucose and hemoglobin screenings today in the clinic. Labs ordered today. Patient to notify provider and/or come to the hospital for vaginal bleeding, leaking of fluid, contractions, or other concerns. Third Trimester of Pregnancy video included in the AVS.    3. Acute irritant contact dermatitis of hand  Encouraged to monitor for factors that aggravate or improve hands. No  rash or dermatitis noted elsewhere only on the hands. Prescription discussed and sent to pharmacy.   -     triamcinolone (KENALOG) 0.5 % cream; Apply 1 application  topically to the appropriate area as directed 2 (Two) Times a Day.  Dispense: 15 g; Refill: 3    4. Special screening examination for diabetes mellitus  -     Gestational Screen 1 Hr ([facility])    5. Screening for deficiency anemia  -     Hemoglobin    6. Immunization counseling  Discussed Tdap immunization recommendations during pregnancy. Patient will consider receiving the Tdap immunization during an upcoming prenatal visit. Tdap (Tetanus, Diptheria, Pertussis) Vaccine: What You Need to Know (VIS) education included in the AVS.    Discussed RSV immunization recommendations during pregnancy. She is to consider receiving the vaccine once she is 32 weeks gestation. Respiratory Syncytial Virus (RSV) Vaccine Injection education included in the AVS.           Return to the office in 2 weeks for a routine prenatal visit with Dr. Chan and as needed with concerns.        This note has been signed electronically.    Mago Miller, DNP, APRN, CNM, RNC-OB

## 2023-12-21 LAB
GLUCOSE 1H P 50 G GLC PO SERPL-MCNC: 107 MG/DL (ref 70–139)
HGB BLD-MCNC: NORMAL G/DL
SPECIMEN STATUS: NORMAL

## 2023-12-26 ENCOUNTER — PATIENT OUTREACH (OUTPATIENT)
Dept: LABOR AND DELIVERY | Facility: HOSPITAL | Age: 23
End: 2023-12-26
Payer: MEDICAID

## 2023-12-26 NOTE — OUTREACH NOTE
[facility]  Check-In    Current Estimated Gestational Age: 28w2d      Questions/Answers      Flowsheet Row Responses   Best Method for Contacting Cell   Currently Employed Yes  [in nursing school and works as CNA at [employer]]   Able to keep appointments as scheduled Yes   Baby Active/Feeling Fetal Movemen Yes   How are you presently feeling? voices no complaints   New Diagnosis Anxiety  [medication started for anxiety and she states she feels much better]   Special Considerations Birthing Ball, Peanut Ball   Resource/Environmental Concerns None   Do you have any questions related to your care experience, your pregnancy, plans for delivery, any concerns, etc? No          Resource letter sent to client in [facility].     Adali Lopes RN  Maternity Nurse Navigator    12/26/2023, 13:41 CST

## 2023-12-26 NOTE — OUTREACH NOTE
[facility]    EPDS questionnaire sent to client in [facility] prior to our telephone check-in this week.     Adali Lopes RN  Maternity Nurse Navigator    12/26/2023, 06:54 CST

## 2024-01-02 ENCOUNTER — ROUTINE PRENATAL (OUTPATIENT)
Dept: OBSTETRICS AND GYNECOLOGY | Facility: CLINIC | Age: 24
End: 2024-01-02
Payer: MEDICAID

## 2024-01-02 VITALS — SYSTOLIC BLOOD PRESSURE: 102 MMHG | DIASTOLIC BLOOD PRESSURE: 68 MMHG | WEIGHT: 149 LBS | BODY MASS INDEX: 22.66 KG/M2

## 2024-01-02 DIAGNOSIS — Z3A.29 29 WEEKS GESTATION OF PREGNANCY: Primary | ICD-10-CM

## 2024-01-02 LAB
GLUCOSE UR STRIP-MCNC: NEGATIVE MG/DL
PROT UR STRIP-MCNC: ABNORMAL MG/DL

## 2024-01-02 NOTE — PROGRESS NOTES
"Feeling ok, but has occasional discomfort in lower abdomen when baby moves, especially when she is bending over.   No contractions.  No LOF or VB  Good FM \"all the time\"  Slowly gaining weight back.  Patient reports she is eating a lot  GCT was 107    "

## 2024-01-23 ENCOUNTER — ROUTINE PRENATAL (OUTPATIENT)
Dept: OBSTETRICS AND GYNECOLOGY | Facility: CLINIC | Age: 24
End: 2024-01-23
Payer: MEDICAID

## 2024-01-23 VITALS — BODY MASS INDEX: 22.81 KG/M2 | DIASTOLIC BLOOD PRESSURE: 72 MMHG | WEIGHT: 150 LBS | SYSTOLIC BLOOD PRESSURE: 134 MMHG

## 2024-01-23 DIAGNOSIS — Z3A.32 32 WEEKS GESTATION OF PREGNANCY: Primary | ICD-10-CM

## 2024-01-23 LAB
GLUCOSE UR STRIP-MCNC: NEGATIVE MG/DL
PROT UR STRIP-MCNC: NEGATIVE MG/DL

## 2024-01-23 PROCEDURE — 99213 OFFICE O/P EST LOW 20 MIN: CPT | Performed by: OBSTETRICS & GYNECOLOGY

## 2024-01-23 NOTE — PROGRESS NOTES
"Patient didn't feel good yesterday, just tired and had a HA.  HA has resolved today.  No abdominal pain.  Good FM \"all the time\".  No LOF or VB  Patient reports trace swelling in feet by the end of the day  Growth scan at next visit for S<D.  Patient reports she is eating well  "

## 2024-01-25 ENCOUNTER — HOSPITAL ENCOUNTER (OUTPATIENT)
Facility: HOSPITAL | Age: 24
Discharge: HOME | End: 2024-01-25
Attending: OBSTETRICS & GYNECOLOGY | Admitting: OBSTETRICS & GYNECOLOGY
Payer: MEDICAID

## 2024-01-25 ENCOUNTER — TELEPHONE (OUTPATIENT)
Dept: OBSTETRICS AND GYNECOLOGY | Facility: CLINIC | Age: 24
End: 2024-01-25
Payer: MEDICAID

## 2024-01-25 VITALS
BODY MASS INDEX: 23.64 KG/M2 | OXYGEN SATURATION: 98 % | HEIGHT: 68 IN | RESPIRATION RATE: 20 BRPM | HEART RATE: 119 BPM | TEMPERATURE: 98.2 F | WEIGHT: 156 LBS

## 2024-01-25 PROCEDURE — G0378 HOSPITAL OBSERVATION PER HR: HCPCS

## 2024-01-25 PROCEDURE — G0463 HOSPITAL OUTPT CLINIC VISIT: HCPCS

## 2024-01-25 NOTE — TELEPHONE ENCOUNTER
Pt called with c/o possible leaking amniotic fluid. Pt took a shower last night and noticed before she could put her pants on, her underwear was wet. Pt states this has also happened a few times this morning. Pt advised to go to LDR to assess for LOF. Pt voiced understanding

## 2024-01-25 NOTE — NURSING NOTE
Patient presents to unit with leaking of fluid that began last night after showering.    Ronda Randall RN  12:08 CST

## 2024-01-25 NOTE — NURSING NOTE
Patient educated on S/S of labor, educated to return to L&D for consistent, painful contractions, leaking of fluid, vaginal bleeding, or decreased fetal movement. Urgent maternal warnings signs reviewed and handout given.    Ronda Randall RN  14:40 CST

## 2024-02-01 ENCOUNTER — HOSPITAL ENCOUNTER (OUTPATIENT)
Facility: HOSPITAL | Age: 24
Discharge: HOME | End: 2024-02-01
Attending: OBSTETRICS & GYNECOLOGY | Admitting: OBSTETRICS & GYNECOLOGY
Payer: MEDICAID

## 2024-02-01 VITALS
TEMPERATURE: 98 F | HEART RATE: 116 BPM | WEIGHT: 157 LBS | HEIGHT: 68 IN | DIASTOLIC BLOOD PRESSURE: 70 MMHG | SYSTOLIC BLOOD PRESSURE: 109 MMHG | RESPIRATION RATE: 18 BRPM | OXYGEN SATURATION: 98 % | BODY MASS INDEX: 23.79 KG/M2

## 2024-02-01 PROBLEM — Z34.90 PREGNANT: Status: ACTIVE | Noted: 2024-02-01

## 2024-02-01 LAB
BLOODY SPECIMEN?: NO
FIBRONECTIN FETAL VAG QL: NEGATIVE

## 2024-02-01 PROCEDURE — G0463 HOSPITAL OUTPT CLINIC VISIT: HCPCS

## 2024-02-01 PROCEDURE — G0378 HOSPITAL OBSERVATION PER HR: HCPCS

## 2024-02-01 PROCEDURE — 82731 ASSAY OF FETAL FIBRONECTIN: CPT | Performed by: OBSTETRICS & GYNECOLOGY

## 2024-02-01 PROCEDURE — 25810000003 LACTATED RINGERS SOLUTION: Performed by: OBSTETRICS & GYNECOLOGY

## 2024-02-01 RX ORDER — SODIUM CHLORIDE 0.9 % (FLUSH) 0.9 %
10 SYRINGE (ML) INJECTION EVERY 12 HOURS SCHEDULED
Status: DISCONTINUED | OUTPATIENT
Start: 2024-02-01 | End: 2024-02-01 | Stop reason: HOSPADM

## 2024-02-01 RX ORDER — SODIUM CHLORIDE 0.9 % (FLUSH) 0.9 %
10 SYRINGE (ML) INJECTION AS NEEDED
Status: DISCONTINUED | OUTPATIENT
Start: 2024-02-01 | End: 2024-02-01 | Stop reason: HOSPADM

## 2024-02-01 RX ADMIN — SODIUM CHLORIDE, POTASSIUM CHLORIDE, SODIUM LACTATE AND CALCIUM CHLORIDE 1000 ML: 600; 310; 30; 20 INJECTION, SOLUTION INTRAVENOUS at 16:16

## 2024-02-05 ENCOUNTER — HOSPITAL ENCOUNTER (OUTPATIENT)
Facility: HOSPITAL | Age: 24
Discharge: HOME | End: 2024-02-05
Attending: OBSTETRICS & GYNECOLOGY | Admitting: OBSTETRICS & GYNECOLOGY
Payer: MEDICAID

## 2024-02-05 VITALS
DIASTOLIC BLOOD PRESSURE: 63 MMHG | RESPIRATION RATE: 18 BRPM | OXYGEN SATURATION: 97 % | WEIGHT: 154.4 LBS | SYSTOLIC BLOOD PRESSURE: 96 MMHG | TEMPERATURE: 98 F | BODY MASS INDEX: 22.87 KG/M2 | HEART RATE: 89 BPM | HEIGHT: 69 IN

## 2024-02-05 PROBLEM — O47.00 PRETERM CONTRACTIONS: Status: ACTIVE | Noted: 2024-02-05

## 2024-02-05 LAB
BACTERIA UR QL AUTO: ABNORMAL /HPF
BILIRUB UR QL STRIP: NEGATIVE
CLARITY UR: ABNORMAL
CLUE CELLS SPEC QL WET PREP: NORMAL
COLOR UR: YELLOW
EXPIRATION DATE: NORMAL
GLUCOSE UR STRIP-MCNC: NEGATIVE MG/DL
HGB UR QL STRIP.AUTO: NEGATIVE
HYALINE CASTS UR QL AUTO: ABNORMAL /LPF
HYDATID CYST SPEC WET PREP: NORMAL
KETONES UR QL STRIP: NEGATIVE
LEUKOCYTE ESTERASE UR QL STRIP.AUTO: ABNORMAL
Lab: NORMAL
NITRITE UR QL STRIP: NEGATIVE
PH UR STRIP.AUTO: 8.5 [PH] (ref 5–8)
PROT UR QL STRIP: NEGATIVE
PROT UR STRIP-MCNC: NEGATIVE MG/DL
RBC # UR STRIP: ABNORMAL /HPF
REF LAB TEST METHOD: ABNORMAL
SP GR UR STRIP: 1.01 (ref 1–1.03)
SQUAMOUS #/AREA URNS HPF: ABNORMAL /HPF
T VAGINALIS SPEC QL WET PREP: NORMAL
UROBILINOGEN UR QL STRIP: ABNORMAL
WBC # UR STRIP: ABNORMAL /HPF
WBC SPEC QL WET PREP: NORMAL
YEAST GENITAL QL WET PREP: NORMAL

## 2024-02-05 PROCEDURE — G0463 HOSPITAL OUTPT CLINIC VISIT: HCPCS

## 2024-02-05 PROCEDURE — 87210 SMEAR WET MOUNT SALINE/INK: CPT | Performed by: OBSTETRICS & GYNECOLOGY

## 2024-02-05 PROCEDURE — 87086 URINE CULTURE/COLONY COUNT: CPT | Performed by: OBSTETRICS & GYNECOLOGY

## 2024-02-05 PROCEDURE — 81002 URINALYSIS NONAUTO W/O SCOPE: CPT | Performed by: OBSTETRICS & GYNECOLOGY

## 2024-02-05 PROCEDURE — G0378 HOSPITAL OBSERVATION PER HR: HCPCS

## 2024-02-05 PROCEDURE — 81001 URINALYSIS AUTO W/SCOPE: CPT | Performed by: OBSTETRICS & GYNECOLOGY

## 2024-02-06 LAB — BACTERIA SPEC AEROBE CULT: NO GROWTH

## 2024-02-07 ENCOUNTER — ROUTINE PRENATAL (OUTPATIENT)
Dept: OBSTETRICS AND GYNECOLOGY | Age: 24
End: 2024-02-07
Payer: COMMERCIAL

## 2024-02-07 VITALS — BODY MASS INDEX: 22.45 KG/M2 | DIASTOLIC BLOOD PRESSURE: 58 MMHG | SYSTOLIC BLOOD PRESSURE: 102 MMHG | WEIGHT: 152 LBS

## 2024-02-07 DIAGNOSIS — Z71.85 IMMUNIZATION COUNSELING: ICD-10-CM

## 2024-02-07 DIAGNOSIS — Z28.21 TETANUS, DIPHTHERIA, AND ACELLULAR PERTUSSIS (TDAP) VACCINATION DECLINED: ICD-10-CM

## 2024-02-07 DIAGNOSIS — Z34.83 MULTIGRAVIDA IN THIRD TRIMESTER: ICD-10-CM

## 2024-02-07 DIAGNOSIS — Z3A.34 34 WEEKS GESTATION OF PREGNANCY: Primary | ICD-10-CM

## 2024-02-07 LAB
GLUCOSE UR STRIP-MCNC: NEGATIVE MG/DL
PROT UR STRIP-MCNC: NEGATIVE MG/DL

## 2024-02-07 PROCEDURE — 99213 OFFICE O/P EST LOW 20 MIN: CPT | Performed by: ADVANCED PRACTICE MIDWIFE

## 2024-02-07 NOTE — PROGRESS NOTES
"Reason for visit: Routine OB visit at 34w3d     CC:  She has been having contractions over the \"past week\". Endorses good fetal movement. Denies VB, LOF, regular contractions, h/a, visual changes, and right upper quadrant pain.     ROS: All systems reviewed and are negative with exception of the following: contractions    Wt 152 lb for a TWG of -1.814 kg (-4 lb), /58, FHTs 149, FH 31 cm  Urine today and reviewed: negative glucose, negative protein    19-week Anatomy scan: normal; posterior placenta with no evidence of placenta previa; small fibroid (1.04 x 1.08 x 0.7 cm)     34-week US today: EFW 48.4%, AC 56.8%, MARU 14.8 cm, DVP 4.68 cm, normal interval growth, vertex, posterior placenta    Exam:  General Appearance:  No visualized signs of distress. Normal mood and behavior  Cardiorespiratory: HR str and reg. Lungs clear. Resp even and unlabored.  Abdomen: is soft and nontender. No CVA tenderness. Uterus is   Ext: No edema. Calves non-tender.     Impression  Diagnoses and all orders for this visit:    1. 34 weeks gestation of pregnancy (Primary)  Discussed and encouraged practicing measures of relaxation during the birthing experience. She is planning to breast feed. Dr. Del Rio is the pediatrician for the family.   -     POC Urinalysis Dipstick    2. Multigravida in third trimester  Discussed third trimester of pregnancy, discomforts and measures of support, fetal movement counts,  labor warnings, preeclampsia warnings, and signs and symptoms to report. Discussed plan for next visit to include cultures for GBS, chlamydia, and gonorrhea. Patient to come to the hospital or call for vaginal bleeding, leaking of fluid, regular or intense contractions, or other concerns. Third Trimester of Pregnancy, Signs and Symptoms of Labor, and Alternative Pain Management During Labor and Delivery videos included in the AVS.    3. Immunization counseling  Reviewed Tdap immunization recommendations during pregnancy. " Declines to receive the Tdap immunization during this prenatal visit. Tdap (Tetanus, Diptheria, Pertussis) Vaccine: What You Need to Know (VIS) education included in the AVS.    4. Tetanus, diphtheria, and acellular pertussis (Tdap) vaccination declined          Return to the office in 2 weeks for routine prenatal visit as scheduled with Dr. Chan and as needed with concerns.        This note has been signed electronically.    Mago Miller, DNP, APRN, CNM, RNC-OB

## 2024-02-12 DIAGNOSIS — Z34.83 MULTIGRAVIDA IN THIRD TRIMESTER: Primary | ICD-10-CM

## 2024-02-13 LAB — HGB BLD-MCNC: 12.1 G/DL (ref 12–15.9)

## 2024-02-19 ENCOUNTER — PATIENT OUTREACH (OUTPATIENT)
Dept: LABOR AND DELIVERY | Facility: HOSPITAL | Age: 24
End: 2024-02-19
Payer: COMMERCIAL

## 2024-02-20 ENCOUNTER — PATIENT OUTREACH (OUTPATIENT)
Dept: LABOR AND DELIVERY | Facility: HOSPITAL | Age: 24
End: 2024-02-20
Payer: COMMERCIAL

## 2024-02-20 NOTE — OUTREACH NOTE
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 states she has gera jimenez all the time.  encouraged her to increase her water intake.  Reviewed with her labor precautions.   Special Considerations Birthing Ball, Peanut Ball   Supplies ready for baby Breast Pump, Car Seat, Clothing, Crib, Diapers, Feeding Supplies   Resource/Environmental Concerns None   Do you have any questions related to your care experience, your pregnancy, plans for delivery, any concerns, etc? No          Resources and prenatal education sent to client in [facility].     Adali Lopes RN  Maternity Nurse Navigator    2/20/2024, 14:22 CST

## 2024-02-21 ENCOUNTER — ROUTINE PRENATAL (OUTPATIENT)
Dept: OBSTETRICS AND GYNECOLOGY | Age: 24
End: 2024-02-21
Payer: COMMERCIAL

## 2024-02-21 VITALS — BODY MASS INDEX: 22.74 KG/M2 | DIASTOLIC BLOOD PRESSURE: 88 MMHG | SYSTOLIC BLOOD PRESSURE: 122 MMHG | WEIGHT: 154 LBS

## 2024-02-21 DIAGNOSIS — O42.90 PREMATURE RUPTURE OF MEMBRANES, UNSPECIFIED DURATION TO ONSET OF LABOR, UNSPECIFIED GESTATIONAL AGE: ICD-10-CM

## 2024-02-21 DIAGNOSIS — Z3A.36 36 WEEKS GESTATION OF PREGNANCY: Primary | ICD-10-CM

## 2024-02-21 LAB
A1 MICROGLOB PLACENTAL VAG QL: NEGATIVE
GLUCOSE UR STRIP-MCNC: NEGATIVE MG/DL
PROT UR STRIP-MCNC: NEGATIVE MG/DL

## 2024-02-21 PROCEDURE — 84112 EVAL AMNIOTIC FLUID PROTEIN: CPT | Performed by: OBSTETRICS & GYNECOLOGY

## 2024-02-21 NOTE — PROGRESS NOTES
Patient c/o burning sensation in middle of her chest (moving hand up and down over upper esophagus) that just started last night.  Encouraged her to try TUMS  Patient having some fluid leakage of fluid or discharge, states she is not sure, just that her underwear will be damp any time she is up and moving around.  ROM test sent to lab.  No pooling on exam.  I do not think patient is ruptured, but will call her to return if test is positive.  Occasional contractions, at night.  No VB.  Good FM

## 2024-02-24 LAB
C TRACH RRNA SPEC QL NAA+PROBE: NEGATIVE
GP B STREP DNA SPEC QL NAA+PROBE: NEGATIVE
N GONORRHOEA RRNA SPEC QL NAA+PROBE: NEGATIVE

## 2024-02-28 ENCOUNTER — ROUTINE PRENATAL (OUTPATIENT)
Dept: OBSTETRICS AND GYNECOLOGY | Age: 24
End: 2024-02-28
Payer: COMMERCIAL

## 2024-02-28 VITALS — SYSTOLIC BLOOD PRESSURE: 98 MMHG | WEIGHT: 157 LBS | BODY MASS INDEX: 23.18 KG/M2 | DIASTOLIC BLOOD PRESSURE: 60 MMHG

## 2024-02-28 DIAGNOSIS — Z3A.37 37 WEEKS GESTATION OF PREGNANCY: Primary | ICD-10-CM

## 2024-02-28 DIAGNOSIS — Z34.83 MULTIGRAVIDA IN THIRD TRIMESTER: ICD-10-CM

## 2024-02-28 LAB
CLARITY, POC: CLEAR
COLOR UR: YELLOW
GLUCOSE UR STRIP-MCNC: NEGATIVE MG/DL
PROT UR STRIP-MCNC: NEGATIVE MG/DL

## 2024-02-28 NOTE — PROGRESS NOTES
"Reason for visit: Routine OB visit at 37w3d     CC:  \"I feel like I am having contractions like every 10\".\". Endorses good fetal movement. Denies VB, LOF, contractions, h/a, visual changes, and right upper quadrant pain.     ROS: All systems reviewed and are negative with exception of the following: fatigue, contractions    Wt 157 lb for a TWG of 0.454 kg (1 lb), BP 98/60, FHTs 153, FH 37 cm  Urine today and reviewed: negative glucose, negative protein    34-week US: EFW 48.4%, AC 56.8%, MARU 14.8 cm, DVP 4.68 cm, normal interval growth, vertex, posterior placenta     Exam:  General Appearance:  No visualized signs of distress. Normal mood and behavior  Cardiorespiratory: HR str and reg. Lungs clear. Resp even and unlabored.  Abdomen: is soft and nontender. No CVA tenderness. Uterus is consistent with estimated gestational age.  : 1-2/50-60%/-3; vertex; posterior medium cervix; intact  Ext: No edema. Calves non-tender.     Impression  Diagnoses and all orders for this visit:    1. 37 weeks gestation of pregnancy (Primary)  Discussed and encouraged practicing measures of relaxation during the birthing experience. Also discussed measures to support or promote labor at term.   -     POC Urinalysis Dipstick    2. Multigravida in third trimester  Discussed third trimester discomforts and measures of support, fetal movement counts, signs of labor, preeclampsia warnings, and signs and symptoms to report. Reviewed GBS results: negative. Discussed and encouraged to come to the hospital or call with vaginal bleeding, leaking of fluid, regular contractions, or other concerns. Signs and Symptoms of Labor and Alternative Pain Management During Labor and Delivery videos included in the AVS.          Return to the office in 1 week for routine prenatal visit with Dr. Chan and as needed with concerns.        This note has been signed electronically.    Mago Miller, DNP, APRN, CNM, RNC-OB    "

## 2024-03-02 ENCOUNTER — HOSPITAL ENCOUNTER (INPATIENT)
Facility: HOSPITAL | Age: 24
LOS: 3 days | Discharge: HOME | End: 2024-03-05
Attending: OBSTETRICS & GYNECOLOGY | Admitting: OBSTETRICS & GYNECOLOGY
Payer: COMMERCIAL

## 2024-03-02 PROBLEM — O42.92 FULL-TERM PREMATURE RUPTURE OF MEMBRANES: Status: ACTIVE | Noted: 2024-03-02

## 2024-03-02 LAB
BASOPHILS # BLD AUTO: 0.04 10*3/MM3 (ref 0–0.2)
BASOPHILS NFR BLD AUTO: 0.4 % (ref 0–1.5)
DEPRECATED RDW RBC AUTO: 43.8 FL (ref 37–54)
EOSINOPHIL # BLD AUTO: 0.1 10*3/MM3 (ref 0–0.4)
EOSINOPHIL NFR BLD AUTO: 0.9 % (ref 0.3–6.2)
ERYTHROCYTE [DISTWIDTH] IN BLOOD BY AUTOMATED COUNT: 12.9 % (ref 12.3–15.4)
HCT VFR BLD AUTO: 34.4 % (ref 34–46.6)
HGB BLD-MCNC: 11.7 G/DL (ref 12–15.9)
IMM GRANULOCYTES # BLD AUTO: 0.05 10*3/MM3 (ref 0–0.05)
IMM GRANULOCYTES NFR BLD AUTO: 0.5 % (ref 0–0.5)
LYMPHOCYTES # BLD AUTO: 2.67 10*3/MM3 (ref 0.7–3.1)
LYMPHOCYTES NFR BLD AUTO: 24.9 % (ref 19.6–45.3)
MCH RBC QN AUTO: 31.6 PG (ref 26.6–33)
MCHC RBC AUTO-ENTMCNC: 34 G/DL (ref 31.5–35.7)
MCV RBC AUTO: 93 FL (ref 79–97)
MONOCYTES # BLD AUTO: 0.77 10*3/MM3 (ref 0.1–0.9)
MONOCYTES NFR BLD AUTO: 7.2 % (ref 5–12)
NEUTROPHILS NFR BLD AUTO: 66.1 % (ref 42.7–76)
NEUTROPHILS NFR BLD AUTO: 7.1 10*3/MM3 (ref 1.7–7)
NRBC BLD AUTO-RTO: 0 /100 WBC (ref 0–0.2)
PLATELET # BLD AUTO: 248 10*3/MM3 (ref 140–450)
PMV BLD AUTO: 9.9 FL (ref 6–12)
RBC # BLD AUTO: 3.7 10*6/MM3 (ref 3.77–5.28)
WBC NRBC COR # BLD AUTO: 10.73 10*3/MM3 (ref 3.4–10.8)

## 2024-03-02 PROCEDURE — 25810000003 LACTATED RINGERS PER 1000 ML: Performed by: OBSTETRICS & GYNECOLOGY

## 2024-03-02 PROCEDURE — 36415 COLL VENOUS BLD VENIPUNCTURE: CPT | Performed by: OBSTETRICS & GYNECOLOGY

## 2024-03-02 PROCEDURE — 85025 COMPLETE CBC W/AUTO DIFF WBC: CPT | Performed by: OBSTETRICS & GYNECOLOGY

## 2024-03-02 PROCEDURE — 86850 RBC ANTIBODY SCREEN: CPT | Performed by: OBSTETRICS & GYNECOLOGY

## 2024-03-02 PROCEDURE — 86900 BLOOD TYPING SEROLOGIC ABO: CPT | Performed by: OBSTETRICS & GYNECOLOGY

## 2024-03-02 PROCEDURE — 86901 BLOOD TYPING SEROLOGIC RH(D): CPT | Performed by: OBSTETRICS & GYNECOLOGY

## 2024-03-02 PROCEDURE — 86780 TREPONEMA PALLIDUM: CPT | Performed by: OBSTETRICS & GYNECOLOGY

## 2024-03-02 RX ORDER — SODIUM CHLORIDE 0.9 % (FLUSH) 0.9 %
10 SYRINGE (ML) INJECTION AS NEEDED
Status: DISCONTINUED | OUTPATIENT
Start: 2024-03-02 | End: 2024-03-03

## 2024-03-02 RX ORDER — ONDANSETRON 4 MG/1
4 TABLET, ORALLY DISINTEGRATING ORAL EVERY 6 HOURS PRN
Status: DISCONTINUED | OUTPATIENT
Start: 2024-03-02 | End: 2024-03-03 | Stop reason: SDUPTHER

## 2024-03-02 RX ORDER — SODIUM CHLORIDE 9 MG/ML
40 INJECTION, SOLUTION INTRAVENOUS AS NEEDED
Status: DISCONTINUED | OUTPATIENT
Start: 2024-03-02 | End: 2024-03-03

## 2024-03-02 RX ORDER — SODIUM CHLORIDE 0.9 % (FLUSH) 0.9 %
10 SYRINGE (ML) INJECTION EVERY 12 HOURS SCHEDULED
Status: DISCONTINUED | OUTPATIENT
Start: 2024-03-02 | End: 2024-03-03

## 2024-03-02 RX ORDER — SODIUM CHLORIDE, SODIUM LACTATE, POTASSIUM CHLORIDE, CALCIUM CHLORIDE 600; 310; 30; 20 MG/100ML; MG/100ML; MG/100ML; MG/100ML
125 INJECTION, SOLUTION INTRAVENOUS CONTINUOUS
Status: DISCONTINUED | OUTPATIENT
Start: 2024-03-02 | End: 2024-03-03

## 2024-03-02 RX ORDER — ONDANSETRON 2 MG/ML
4 INJECTION INTRAMUSCULAR; INTRAVENOUS EVERY 6 HOURS PRN
Status: DISCONTINUED | OUTPATIENT
Start: 2024-03-02 | End: 2024-03-03 | Stop reason: SDUPTHER

## 2024-03-02 RX ORDER — ACETAMINOPHEN 325 MG/1
650 TABLET ORAL EVERY 4 HOURS PRN
Status: DISCONTINUED | OUTPATIENT
Start: 2024-03-02 | End: 2024-03-03

## 2024-03-02 RX ORDER — CITRIC ACID/SODIUM CITRATE 334-500MG
30 SOLUTION, ORAL ORAL ONCE AS NEEDED
Status: DISCONTINUED | OUTPATIENT
Start: 2024-03-02 | End: 2024-03-03

## 2024-03-02 RX ORDER — TERBUTALINE SULFATE 1 MG/ML
0.25 INJECTION, SOLUTION SUBCUTANEOUS AS NEEDED
Status: DISCONTINUED | OUTPATIENT
Start: 2024-03-02 | End: 2024-03-03

## 2024-03-02 RX ADMIN — SODIUM CHLORIDE, POTASSIUM CHLORIDE, SODIUM LACTATE AND CALCIUM CHLORIDE 125 ML/HR: 600; 310; 30; 20 INJECTION, SOLUTION INTRAVENOUS at 23:05

## 2024-03-03 ENCOUNTER — ANESTHESIA (OUTPATIENT)
Dept: LABOR AND DELIVERY | Facility: HOSPITAL | Age: 24
End: 2024-03-03
Payer: COMMERCIAL

## 2024-03-03 ENCOUNTER — ANESTHESIA EVENT (OUTPATIENT)
Dept: LABOR AND DELIVERY | Facility: HOSPITAL | Age: 24
End: 2024-03-03
Payer: COMMERCIAL

## 2024-03-03 PROBLEM — Z64.1 MULTIGRAVIDA: Status: RESOLVED | Noted: 2023-09-26 | Resolved: 2024-03-03

## 2024-03-03 PROBLEM — Z34.90 PREGNANCY: Status: RESOLVED | Noted: 2023-09-26 | Resolved: 2024-03-03

## 2024-03-03 PROBLEM — O47.00 PRETERM CONTRACTIONS: Status: RESOLVED | Noted: 2024-02-05 | Resolved: 2024-03-03

## 2024-03-03 PROBLEM — Z34.90 PREGNANT: Status: RESOLVED | Noted: 2024-02-01 | Resolved: 2024-03-03

## 2024-03-03 LAB
ABO GROUP BLD: NORMAL
BLD GP AB SCN SERPL QL: NEGATIVE
RH BLD: POSITIVE
T PALLIDUM IGG SER QL: NORMAL
T&S EXPIRATION DATE: NORMAL

## 2024-03-03 PROCEDURE — 51702 INSERT TEMP BLADDER CATH: CPT

## 2024-03-03 PROCEDURE — 25810000003 LACTATED RINGERS PER 1000 ML: Performed by: OBSTETRICS & GYNECOLOGY

## 2024-03-03 PROCEDURE — 25010000002 ROPIVACAINE PER 1 MG: Performed by: NURSE ANESTHETIST, CERTIFIED REGISTERED

## 2024-03-03 PROCEDURE — 59410 OBSTETRICAL CARE: CPT | Performed by: OBSTETRICS & GYNECOLOGY

## 2024-03-03 PROCEDURE — C1755 CATHETER, INTRASPINAL: HCPCS | Performed by: NURSE ANESTHETIST, CERTIFIED REGISTERED

## 2024-03-03 PROCEDURE — 25010000002 ONDANSETRON PER 1 MG: Performed by: OBSTETRICS & GYNECOLOGY

## 2024-03-03 PROCEDURE — 0HQ9XZZ REPAIR PERINEUM SKIN, EXTERNAL APPROACH: ICD-10-PCS | Performed by: OBSTETRICS & GYNECOLOGY

## 2024-03-03 PROCEDURE — 25810000003 LACTATED RINGERS SOLUTION: Performed by: OBSTETRICS & GYNECOLOGY

## 2024-03-03 PROCEDURE — 25010000002 BUTORPHANOL PER 1 MG: Performed by: OBSTETRICS & GYNECOLOGY

## 2024-03-03 RX ORDER — OXYTOCIN/0.9 % SODIUM CHLORIDE 30/500 ML
2-20 PLASTIC BAG, INJECTION (ML) INTRAVENOUS
Status: DISCONTINUED | OUTPATIENT
Start: 2024-03-03 | End: 2024-03-03

## 2024-03-03 RX ORDER — IBUPROFEN 600 MG/1
600 TABLET ORAL EVERY 6 HOURS PRN
Status: DISCONTINUED | OUTPATIENT
Start: 2024-03-03 | End: 2024-03-03

## 2024-03-03 RX ORDER — CITRIC ACID/SODIUM CITRATE 334-500MG
30 SOLUTION, ORAL ORAL ONCE
Status: DISCONTINUED | OUTPATIENT
Start: 2024-03-03 | End: 2024-03-03

## 2024-03-03 RX ORDER — CALCIUM CARBONATE 500 MG/1
2 TABLET, CHEWABLE ORAL 3 TIMES DAILY PRN
Status: DISCONTINUED | OUTPATIENT
Start: 2024-03-03 | End: 2024-03-03

## 2024-03-03 RX ORDER — IBUPROFEN 600 MG/1
600 TABLET ORAL EVERY 6 HOURS PRN
Status: DISCONTINUED | OUTPATIENT
Start: 2024-03-03 | End: 2024-03-05 | Stop reason: HOSPADM

## 2024-03-03 RX ORDER — METHYLERGONOVINE MALEATE 0.2 MG/ML
200 INJECTION INTRAVENOUS ONCE AS NEEDED
Status: DISCONTINUED | OUTPATIENT
Start: 2024-03-03 | End: 2024-03-05 | Stop reason: HOSPADM

## 2024-03-03 RX ORDER — OXYTOCIN/0.9 % SODIUM CHLORIDE 30/500 ML
999 PLASTIC BAG, INJECTION (ML) INTRAVENOUS ONCE
Status: DISCONTINUED | OUTPATIENT
Start: 2024-03-03 | End: 2024-03-03 | Stop reason: HOSPADM

## 2024-03-03 RX ORDER — HYDROCORTISONE 25 MG/G
1 CREAM TOPICAL AS NEEDED
Status: DISCONTINUED | OUTPATIENT
Start: 2024-03-03 | End: 2024-03-05 | Stop reason: HOSPADM

## 2024-03-03 RX ORDER — CARBOPROST TROMETHAMINE 250 UG/ML
250 INJECTION, SOLUTION INTRAMUSCULAR AS NEEDED
Status: DISCONTINUED | OUTPATIENT
Start: 2024-03-03 | End: 2024-03-03

## 2024-03-03 RX ORDER — BISACODYL 10 MG
10 SUPPOSITORY, RECTAL RECTAL DAILY PRN
Status: DISCONTINUED | OUTPATIENT
Start: 2024-03-04 | End: 2024-03-05 | Stop reason: HOSPADM

## 2024-03-03 RX ORDER — NALOXONE HCL 0.4 MG/ML
0.4 VIAL (ML) INJECTION
Status: DISCONTINUED | OUTPATIENT
Start: 2024-03-03 | End: 2024-03-05 | Stop reason: HOSPADM

## 2024-03-03 RX ORDER — DOCUSATE SODIUM 100 MG/1
100 CAPSULE, LIQUID FILLED ORAL 2 TIMES DAILY
Status: DISCONTINUED | OUTPATIENT
Start: 2024-03-03 | End: 2024-03-05 | Stop reason: HOSPADM

## 2024-03-03 RX ORDER — ONDANSETRON 4 MG/1
4 TABLET, ORALLY DISINTEGRATING ORAL EVERY 6 HOURS PRN
Status: DISCONTINUED | OUTPATIENT
Start: 2024-03-03 | End: 2024-03-03

## 2024-03-03 RX ORDER — METHYLERGONOVINE MALEATE 0.2 MG/ML
200 INJECTION INTRAVENOUS ONCE AS NEEDED
Status: DISCONTINUED | OUTPATIENT
Start: 2024-03-03 | End: 2024-03-03

## 2024-03-03 RX ORDER — MISOPROSTOL 200 UG/1
800 TABLET ORAL AS NEEDED
Status: DISCONTINUED | OUTPATIENT
Start: 2024-03-03 | End: 2024-03-03

## 2024-03-03 RX ORDER — LIDOCAINE HYDROCHLORIDE 20 MG/ML
INJECTION, SOLUTION EPIDURAL; INFILTRATION; INTRACAUDAL; PERINEURAL AS NEEDED
Status: DISCONTINUED | OUTPATIENT
Start: 2024-03-03 | End: 2024-03-03 | Stop reason: SURG

## 2024-03-03 RX ORDER — ONDANSETRON 4 MG/1
4 TABLET, ORALLY DISINTEGRATING ORAL EVERY 8 HOURS PRN
Status: DISCONTINUED | OUTPATIENT
Start: 2024-03-03 | End: 2024-03-05 | Stop reason: HOSPADM

## 2024-03-03 RX ORDER — EPHEDRINE SULFATE 50 MG/ML
10 INJECTION, SOLUTION INTRAVENOUS
Status: DISCONTINUED | OUTPATIENT
Start: 2024-03-03 | End: 2024-03-03

## 2024-03-03 RX ORDER — MISOPROSTOL 200 UG/1
600 TABLET ORAL ONCE AS NEEDED
Status: DISCONTINUED | OUTPATIENT
Start: 2024-03-03 | End: 2024-03-05 | Stop reason: HOSPADM

## 2024-03-03 RX ORDER — ACETAMINOPHEN 325 MG/1
650 TABLET ORAL EVERY 6 HOURS PRN
Status: DISCONTINUED | OUTPATIENT
Start: 2024-03-03 | End: 2024-03-05 | Stop reason: HOSPADM

## 2024-03-03 RX ORDER — PROMETHAZINE HYDROCHLORIDE 12.5 MG/1
12.5 SUPPOSITORY RECTAL EVERY 6 HOURS PRN
Status: DISCONTINUED | OUTPATIENT
Start: 2024-03-03 | End: 2024-03-05 | Stop reason: HOSPADM

## 2024-03-03 RX ORDER — LIDOCAINE HYDROCHLORIDE AND EPINEPHRINE 15; 5 MG/ML; UG/ML
INJECTION, SOLUTION EPIDURAL AS NEEDED
Status: DISCONTINUED | OUTPATIENT
Start: 2024-03-03 | End: 2024-03-03 | Stop reason: SURG

## 2024-03-03 RX ORDER — CALCIUM CARBONATE 500 MG/1
2 TABLET, CHEWABLE ORAL 3 TIMES DAILY PRN
Status: DISCONTINUED | OUTPATIENT
Start: 2024-03-03 | End: 2024-03-05 | Stop reason: HOSPADM

## 2024-03-03 RX ORDER — PRENATAL VIT/IRON FUM/FOLIC AC 27MG-0.8MG
1 TABLET ORAL DAILY
Status: DISCONTINUED | OUTPATIENT
Start: 2024-03-03 | End: 2024-03-05 | Stop reason: HOSPADM

## 2024-03-03 RX ORDER — ONDANSETRON 2 MG/ML
4 INJECTION INTRAMUSCULAR; INTRAVENOUS EVERY 6 HOURS PRN
Status: DISCONTINUED | OUTPATIENT
Start: 2024-03-03 | End: 2024-03-05 | Stop reason: HOSPADM

## 2024-03-03 RX ORDER — SODIUM CHLORIDE 0.9 % (FLUSH) 0.9 %
1-10 SYRINGE (ML) INJECTION AS NEEDED
Status: DISCONTINUED | OUTPATIENT
Start: 2024-03-03 | End: 2024-03-05 | Stop reason: HOSPADM

## 2024-03-03 RX ORDER — ONDANSETRON 2 MG/ML
4 INJECTION INTRAMUSCULAR; INTRAVENOUS EVERY 6 HOURS PRN
Status: DISCONTINUED | OUTPATIENT
Start: 2024-03-03 | End: 2024-03-03

## 2024-03-03 RX ORDER — HYDROCODONE BITARTRATE AND ACETAMINOPHEN 5; 325 MG/1; MG/1
1 TABLET ORAL EVERY 4 HOURS PRN
Status: DISCONTINUED | OUTPATIENT
Start: 2024-03-03 | End: 2024-03-05 | Stop reason: HOSPADM

## 2024-03-03 RX ORDER — HYDROXYZINE HYDROCHLORIDE 25 MG/1
50 TABLET, FILM COATED ORAL NIGHTLY PRN
Status: DISCONTINUED | OUTPATIENT
Start: 2024-03-03 | End: 2024-03-05 | Stop reason: HOSPADM

## 2024-03-03 RX ORDER — FAMOTIDINE 10 MG/ML
20 INJECTION, SOLUTION INTRAVENOUS ONCE AS NEEDED
OUTPATIENT
Start: 2024-03-03

## 2024-03-03 RX ORDER — HYDROCODONE BITARTRATE AND ACETAMINOPHEN 10; 325 MG/1; MG/1
1 TABLET ORAL EVERY 4 HOURS PRN
Status: DISCONTINUED | OUTPATIENT
Start: 2024-03-03 | End: 2024-03-05 | Stop reason: HOSPADM

## 2024-03-03 RX ORDER — OXYTOCIN/0.9 % SODIUM CHLORIDE 30/500 ML
250 PLASTIC BAG, INJECTION (ML) INTRAVENOUS CONTINUOUS
Status: ACTIVE | OUTPATIENT
Start: 2024-03-03 | End: 2024-03-03

## 2024-03-03 RX ORDER — BUSPIRONE HYDROCHLORIDE 5 MG/1
5 TABLET ORAL 3 TIMES DAILY PRN
Status: DISCONTINUED | OUTPATIENT
Start: 2024-03-03 | End: 2024-03-05 | Stop reason: HOSPADM

## 2024-03-03 RX ORDER — PROMETHAZINE HYDROCHLORIDE 25 MG/1
25 TABLET ORAL EVERY 6 HOURS PRN
Status: DISCONTINUED | OUTPATIENT
Start: 2024-03-03 | End: 2024-03-05 | Stop reason: HOSPADM

## 2024-03-03 RX ORDER — OXYTOCIN/0.9 % SODIUM CHLORIDE 30/500 ML
125 PLASTIC BAG, INJECTION (ML) INTRAVENOUS ONCE AS NEEDED
Status: DISCONTINUED | OUTPATIENT
Start: 2024-03-03 | End: 2024-03-05 | Stop reason: HOSPADM

## 2024-03-03 RX ORDER — MORPHINE SULFATE 2 MG/ML
1 INJECTION, SOLUTION INTRAMUSCULAR; INTRAVENOUS EVERY 4 HOURS PRN
Status: DISCONTINUED | OUTPATIENT
Start: 2024-03-03 | End: 2024-03-04

## 2024-03-03 RX ADMIN — Medication 250 ML/HR: at 08:49

## 2024-03-03 RX ADMIN — ONDANSETRON 4 MG: 2 INJECTION INTRAMUSCULAR; INTRAVENOUS at 02:32

## 2024-03-03 RX ADMIN — SODIUM CHLORIDE, POTASSIUM CHLORIDE, SODIUM LACTATE AND CALCIUM CHLORIDE 1000 ML: 600; 310; 30; 20 INJECTION, SOLUTION INTRAVENOUS at 04:51

## 2024-03-03 RX ADMIN — LIDOCAINE HYDROCHLORIDE AND EPINEPHRINE 3 ML: 15; 5 INJECTION, SOLUTION EPIDURAL at 05:19

## 2024-03-03 RX ADMIN — LIDOCAINE HYDROCHLORIDE 5 ML: 20 INJECTION, SOLUTION EPIDURAL; INFILTRATION; INTRACAUDAL; PERINEURAL at 05:24

## 2024-03-03 RX ADMIN — DOCUSATE SODIUM 100 MG: 100 CAPSULE, LIQUID FILLED ORAL at 20:51

## 2024-03-03 RX ADMIN — BUTORPHANOL TARTRATE 2 MG: 2 INJECTION, SOLUTION INTRAMUSCULAR; INTRAVENOUS at 02:22

## 2024-03-03 RX ADMIN — Medication 1 APPLICATION: at 11:54

## 2024-03-03 RX ADMIN — IBUPROFEN 600 MG: 600 TABLET, FILM COATED ORAL at 19:30

## 2024-03-03 RX ADMIN — DOCUSATE SODIUM 100 MG: 100 CAPSULE, LIQUID FILLED ORAL at 11:54

## 2024-03-03 RX ADMIN — Medication 2 MILLI-UNITS/MIN: at 06:22

## 2024-03-03 RX ADMIN — ACETAMINOPHEN 650 MG: 325 TABLET, FILM COATED ORAL at 21:07

## 2024-03-03 RX ADMIN — ROPIVACAINE HYDROCHLORIDE 8 ML/HR: 2 INJECTION, SOLUTION EPIDURAL; INFILTRATION at 05:31

## 2024-03-03 RX ADMIN — PRENATAL VIT W/ FE FUMARATE-FA TAB 27-0.8 MG 1 TABLET: 27-0.8 TAB at 11:54

## 2024-03-03 RX ADMIN — IBUPROFEN 600 MG: 600 TABLET, FILM COATED ORAL at 12:01

## 2024-03-03 RX ADMIN — SODIUM CHLORIDE, POTASSIUM CHLORIDE, SODIUM LACTATE AND CALCIUM CHLORIDE 125 ML/HR: 600; 310; 30; 20 INJECTION, SOLUTION INTRAVENOUS at 05:31

## 2024-03-03 NOTE — PAYOR COMM NOTE
"ADMIT INPT 3-2-24  UR  [phone]    Hermilo Mckinnon (23 y.o. Female)       Date of Birth   2000    Social Security Number       Address   [address] [address]    Home Phone   [phone]    MRN   [id number]       Muslim   None    Marital Status   Single                            Admission Date   3/2/24    Admission Type   Elective    Admitting Provider   Salvador Pineda MD    Attending Provider   Anushka Chan MD    Department, Room/Bed   [facility] MOTHER BABY 2A, M237/1       Discharge Date       Discharge Disposition       Discharge Destination                                 Attending Provider: Anushka Chan MD    Allergies: Dog Epithelium, Dust Mite Extract, Rabbit Epithelium, Mouse Epithelium Allergy Skin Test, Latex    Isolation: None   Infection: None   Code Status: CPR    Ht: 175.3 cm (69\")   Wt: 72.7 kg (160 lb 3.2 oz)    Admission Cmt: None   Principal Problem: Normal labor and delivery [O80]                   Active Insurance as of 3/2/2024       Primary Coverage       Payor Plan Insurance Group Employer/Plan Group    [employer] [place] [employer] [place]ICA[place]        Payor Plan Address Payor Plan Phone Number Payor Plan Fax Number Effective Dates    [address] [address] [phone]  2023 - None Entered    [place] [address]         Subscriber Name Subscriber Birth Date Member ID       HERMILO MCKINNON 2000 [id number]                     Emergency Contacts        (Rel.) Home Phone Work Phone Mobile Phone    Susan Kaye (Mother) [phone] -- --    kirby miller (Significant Other) -- -- [phone]                 History & Physical        Salvador Pineda MD at 24 0759          [facility]  Hermilo Mckinnon  : 2000  MRN: [id number]  CSN: [id number]    History and Physical    Subjective  Hermilo Mckinnon is a 23 y.o. year old  with an Estimated Date of Delivery: 3/17/24 currently at 38w0d presenting with " leaking fluid.    Prenatal care has been with Dr. Anushka Chan.  It has been benign.    OB History    Para Term  AB Living   2 1 1 0 0 1   SAB IAB Ectopic Molar Multiple Live Births   0 0 0 0 0 1      # Outcome Date GA Lbr Paul/2nd Weight Sex Type Anes PTL Lv   2 Current            1 Term 23 39w0d   F Vag-Spont EPI N BILL     Past Medical History:   Diagnosis Date    Depression      Past Surgical History:   Procedure Laterality Date    WISDOM TOOTH EXTRACTION         Current Facility-Administered Medications:     acetaminophen (TYLENOL) tablet 650 mg, 650 mg, Oral, Q4H PRN, Salvador Pineda MD    butorphanol (STADOL) injection 1 mg, 1 mg, Intravenous, Q3H PRN, Salvador Pineda MD    butorphanol (STADOL) injection 2 mg, 2 mg, Intravenous, Q3H PRN, Salvador Pineda MD, 2 mg at 24 0222    calcium carbonate (TUMS) chewable tablet 500 mg (200 mg elemental), 2 tablet, Oral, TID PRN, Salvador Pineda MD    carboprost (HEMABATE) injection 250 mcg, 250 mcg, Intramuscular, PRN, Salvador Pineda MD    ePHEDrine injection 10 mg, 10 mg, Intravenous, Q5 Min PRN, Koffi Raymundo CRNA    HYDROmorphone (DILAUDID) injection 1 mg, 1 mg, Intravenous, Q2H PRN, Salvador Pineda MD    ibuprofen (ADVIL,MOTRIN) tablet 600 mg, 600 mg, Oral, Q6H PRN, Salvador Pineda MD    lactated ringers infusion, 125 mL/hr, Intravenous, Continuous, Salvador Pineda MD, Stopped at 24 0744    methylergonovine (METHERGINE) injection 200 mcg, 200 mcg, Intramuscular, Once PRN, Salvador Pineda MD    miSOPROStol (CYTOTEC) tablet 800 mcg, 800 mcg, Rectal, PRN, Salvador Pineda MD    ondansetron ODT (ZOFRAN-ODT) disintegrating tablet 4 mg, 4 mg, Oral, Q6H PRN **OR** ondansetron (ZOFRAN) injection 4 mg, 4 mg, Intravenous, Q6H PRN, [place], Salvador WINKLER MD    oxytocin (PITOCIN) 30 units in 0.9% sodium chloride 500 mL (premix), 999 mL/hr, Intravenous, Once **FOLLOWED BY** oxytocin (PITOCIN) 30  "units in 0.9% sodium chloride 500 mL (premix), 250 mL/hr, Intravenous, Continuous, Salvador Pineda MD    oxytocin (PITOCIN) 30 units in 0.9% sodium chloride 500 mL (premix), 2-20 julita-units/min, Intravenous, Titrated, Salvador Pineda MD, Last Rate: 250 mL/hr at 03/03/24 0755, 250 julita-units/min at 03/03/24 0755    Sod Citrate-Citric Acid (BICITRA) oral solution 30 mL, 30 mL, Oral, Once PRN, Salvador Pineda MD    Sod Citrate-Citric Acid (BICITRA) oral solution 30 mL, 30 mL, Oral, Once, Koffi Raymundo CRNA    sodium chloride 0.9 % flush 10 mL, 10 mL, Intravenous, Q12H, Salvador Pineda MD    sodium chloride 0.9 % flush 10 mL, 10 mL, Intravenous, PRN, Salvador Pineda MD    sodium chloride 0.9 % infusion 40 mL, 40 mL, Intravenous, PRN, Salvador Pineda MD    terbutaline (BRETHINE) injection 0.25 mg, 0.25 mg, Subcutaneous, PRN, Salvador Pineda MD    Allergies   Allergen Reactions    Dog Epithelium Rash    Dust Mite Extract Shortness Of Breath    Rabbit Epithelium Hives    Mouse Epithelium Allergy Skin Test Hives    Latex Rash     Social History    Tobacco Use      Smoking status: Never      Smokeless tobacco: Never    Review of Systems      Objective  /60 (BP Location: Left arm, Patient Position: Sitting)   Pulse 97   Temp 98.2 °F (36.8 °C) (Oral)   Resp 16   Ht 175.3 cm (69\")   Wt 72.7 kg (160 lb 3.2 oz)   LMP 06/01/2023 (Exact Date)   SpO2 100%   BMI 23.66 kg/m²   General: well developed; well nourished  no acute distress   Heart: regular rate and rhythm   Lungs: breathing is unlabored   Abdomen: soft, non-tender; no masses   FHT's: reactive   Cervix: was checked (by RN): 2 cm / 70 % / -2   Presentation: cephalic   Contractions:  EFW by Leopold's:  EFW by recent u/s: irregular           Prenatal Labs  Lab Results   Component Value Date    HGB 11.7 (L) 03/02/2024    HEPBSAG Negative 07/28/2023    ABORH O POS 06/05/2021    ABO O 03/02/2024    RH Positive 03/02/2024    " ABSCRN Negative 03/02/2024    [id number] Non Reactive 07/28/2023    HEPCVIRUSABY Non Reactive 07/28/2023    URINECX No growth 02/05/2024       Current Labs Reviewed   No data reviewed       Assessment  IUP at 38w0d  Fetus reassuring  Group B strep status: negative  Ruptured membranes  Early labor     Plan  Admit to L&D, augment with pitocin, epidural prn    Salvador Pineda MD  3/3/2024  07:59 CST         Electronically signed by Salvador Pineda MD at 03/03/24 0800       Facility-Administered Medications as of 3/3/2024   Medication Dose Route Frequency Provider Last Rate Last Admin    acetaminophen (TYLENOL) tablet 650 mg  650 mg Oral Q6H PRN Salvador Pineda MD        benzocaine-menthol (DERMOPLAST) 20-0.5 % topical spray   Topical PRN [place]   1 Application at 03/03/24 1154    [START ON 3/4/2024] bisacodyl (DULCOLAX) suppository 10 mg  10 mg Rectal Daily PRN [place] MD        busPIRone (BUSPAR) tablet 5 mg  5 mg Oral TID PRN Salvador Pineda MD        calcium carbonate (TUMS) chewable tablet 500 mg (200 mg elemental)  2 tablet Oral TID PRN Salvador Pineda MD        docusate sodium (COLACE) capsule 100 mg  100 mg Oral BID [place]   100 mg at 03/03/24 1154    HYDROcodone-acetaminophen (NORCO) 5-325 MG per tablet 1 tablet  1 tablet Oral Q4H PRN Salvador Pineda MD        Or    HYDROcodone-acetaminophen (NORCO)  MG per tablet 1 tablet  1 tablet Oral Q4H PRN Salvador Pineda MD        Hydrocortisone (Perianal) (ANUSOL-HC) 2.5 % rectal cream 1 Application  1 Application Rectal PRN [place] MD        hydrOXYzine (ATARAX) tablet 50 mg  50 mg Oral Nightly PRN [place]        ibuprofen (ADVIL,MOTRIN) tablet 600 mg  600 mg Oral Q6H PRN Salvador Pineda MD   600 mg at 03/03/24 1201    [COMPLETED] lactated ringers bolus 1,000 mL  1,000 mL Intravenous Once PRN Salvador Pineda MD   Stopped at 03/03/24 0519     magnesium hydroxide (MILK OF MAGNESIA) suspension 10 mL  10 mL Oral Daily PRN [place], Salvador WIKNLER MD        Measles, Mumps & Rubella Vac (MMR) injection 0.5 mL  0.5 mL Subcutaneous Once PRN [place], Salvador WINKLER MD        methylergonovine (METHERGINE) injection 200 mcg  200 mcg Intramuscular Once PRN [place], Salvador WINKLER MD        miSOPROStol (CYTOTEC) tablet 600 mcg  600 mcg Oral Once PRN [place], Salvador WINKLER MD        Morphine sulfate (PF) injection 1 mg  1 mg Intravenous Q4H PRN [place], Salvador WINKLER MD        And    naloxone (NARCAN) injection 0.4 mg  0.4 mg Intravenous Q5 Min PRN [place], Salvador WINKLER MD        ondansetron (ZOFRAN) injection 4 mg  4 mg Intravenous Q6H PRN [place], Salvador WINKLER MD        ondansetron ODT (ZOFRAN-ODT) disintegrating tablet 4 mg  4 mg Oral Q8H PRN [place], Salvador WINKLER MD        [] oxytocin (PITOCIN) 30 units in 0.9% sodium chloride 500 mL (premix)  250 mL/hr Intravenous Continuous [place], Salvador WINKLER  mL/hr at 24 0849 250 mL/hr at 24 0849    oxytocin (PITOCIN) 30 units in 0.9% sodium chloride 500 mL (premix)  125 mL/hr Intravenous Once PRN [place], Salvador WINKLER MD        pramoxine-hydrocortisone 1-1 % foam   Rectal PRN [place], Salvador WINKLER MD        prenatal vitamin tablet 1 tablet  1 tablet Oral Daily [place], Salvador WINKLER MD   1 tablet at 24 1154    promethazine (PHENERGAN) tablet 25 mg  25 mg Oral Q6H PRN [place], Salvador WINKLER MD        Or    promethazine (PHENERGAN) suppository 12.5 mg  12.5 mg Rectal Q6H PRN [place], Salvador WINKLER MD        sodium chloride 0.9 % flush 1-10 mL  1-10 mL Intravenous PRN [place], Salvador WINKLER MD         Orders (all)        Start     Ordered    24 0800  Sitz Bath  3 Times Daily        Comments: PRN    24 1104    24 0600  Hemoglobin & Hematocrit, Blood  Timed        Comments: Postpartum Day 1      24 1104    24 0000  bisacodyl (DULCOLAX) suppository 10 mg  Daily PRN         24 1104     03/03/24 1237  Diet: Regular/House; Fluid Consistency: Thin (IDDSI 0)  Diet Effective Now         03/03/24 1237    03/03/24 1224  Diet: Regular/House Diet; Texture: Regular (IDDSI 7); Fluid Consistency: Thin (IDDSI 0)  Diet Effective Now,   Status:  Canceled         03/03/24 1223    03/03/24 1200  docusate sodium (COLACE) capsule 100 mg  2 Times Daily         03/03/24 1104    03/03/24 1200  prenatal vitamin tablet 1 tablet  Daily         03/03/24 1104    03/03/24 1105  Code Status and Medical Interventions:  Continuous         03/03/24 1104    03/03/24 1105  Vital Signs Per Hospital Policy  Per Hospital Policy         03/03/24 1104    03/03/24 1105  Notify Physician  Until Discontinued         03/03/24 1104    03/03/24 1105  Up Ad Ashanti  Until Discontinued         03/03/24 1104    03/03/24 1105  Ambulate Patient  Every Shift       03/03/24 1104    03/03/24 1105  Diet: Regular/House Diet; Texture: Regular Texture (IDDSI 7); Fluid Consistency: Thin (IDDSI 0)  Diet Effective Now,   Status:  Canceled         03/03/24 1104    03/03/24 1105  Advance Diet As Tolerated -Regular  Until Discontinued         03/03/24 1104    03/03/24 1105  Fundal and Lochia Check  Per Hospital Policy        Comments: Q 15 min x 4, Q 30 min x 2, then Q Shift    03/03/24 1104    03/03/24 1105  RN to Assess Rh Status & Place RhIG Evaluation Order if Indicated  Continuous         03/03/24 1104    03/03/24 1105  Bladder Assessment  Per Order Details        Comments: Postpartum 1) Upon Admission to Unit & Every 4 Hours PRN Until Voiding. 2) Out of Bed to Void in 8 Hours.    03/03/24 1104    03/03/24 1105  Straight Cath  Per Order Details        Comments: Postpartum: If Distended & Unable to Void, May Repeat Once.    03/03/24 1104    03/03/24 1105  Indwelling Urinary Catheter  Per Order Details        Comments: Postpartum : After Straight Cathed x2 or if Greater Than 1000mL Residual, Insert Indwelling Urinary Catheter Until Further MD Order.     "03/03/24 1104    03/03/24 1105  Breast pump to bed  Once         03/03/24 1104    03/03/24 1105  If indicated -- Please administer RH Immunoglobulin based on results of cord blood evaluation and fetal screen lab tests, pharmacy to dispense  Continuous        Comments: See process instructions for reference range details.    03/03/24 1104    03/03/24 1104  sodium chloride 0.9 % flush 1-10 mL  As Needed         03/03/24 1104    03/03/24 1104  oxytocin (PITOCIN) 30 units in 0.9% sodium chloride 500 mL (premix)  Once As Needed         03/03/24 1104    03/03/24 1104  ibuprofen (ADVIL,MOTRIN) tablet 600 mg  Every 6 Hours PRN         03/03/24 1104    03/03/24 1104  acetaminophen (TYLENOL) tablet 650 mg  Every 6 Hours PRN         03/03/24 1104    03/03/24 1104  HYDROcodone-acetaminophen (NORCO) 5-325 MG per tablet 1 tablet  Every 4 Hours PRN        Placed in \"Or\" Linked Group    03/03/24 1104    03/03/24 1104  HYDROcodone-acetaminophen (NORCO)  MG per tablet 1 tablet  Every 4 Hours PRN        Placed in \"Or\" Linked Group    03/03/24 1104    03/03/24 1104  Morphine sulfate (PF) injection 1 mg  Every 4 Hours PRN        Placed in \"And\" Linked Group    03/03/24 1104    03/03/24 1104  naloxone (NARCAN) injection 0.4 mg  Every 5 Minutes PRN        Placed in \"And\" Linked Group    03/03/24 1104    03/03/24 1104  miSOPROStol (CYTOTEC) tablet 600 mcg  Once As Needed         03/03/24 1104    03/03/24 1104  methylergonovine (METHERGINE) injection 200 mcg  Once As Needed         03/03/24 1104    03/03/24 1104  hydrOXYzine (ATARAX) tablet 50 mg  Nightly PRN         03/03/24 1104    03/03/24 1104  magnesium hydroxide (MILK OF MAGNESIA) suspension 10 mL  Daily PRN         03/03/24 1104    03/03/24 1104  benzocaine-menthol (DERMOPLAST) 20-0.5 % topical spray  As Needed         03/03/24 1104    03/03/24 1104  pramoxine-hydrocortisone 1-1 % foam  As Needed         03/03/24 1104    03/03/24 1104  Hydrocortisone (Perianal) (ANUSOL-HC) 2.5 " "% rectal cream 1 Application  As Needed         03/03/24 1104    03/03/24 1104  ondansetron ODT (ZOFRAN-ODT) disintegrating tablet 4 mg  Every 8 Hours PRN         03/03/24 1104    03/03/24 1104  ondansetron (ZOFRAN) injection 4 mg  Every 6 Hours PRN         03/03/24 1104    03/03/24 1104  promethazine (PHENERGAN) tablet 25 mg  Every 6 Hours PRN        Placed in \"Or\" Linked Group    03/03/24 1104    03/03/24 1104  promethazine (PHENERGAN) suppository 12.5 mg  Every 6 Hours PRN        Placed in \"Or\" Linked Group    03/03/24 1104    03/03/24 1104  calcium carbonate (TUMS) chewable tablet 500 mg (200 mg elemental)  3 Times Daily PRN         03/03/24 1104    03/03/24 1104  Measles, Mumps & Rubella Vac (MMR) injection 0.5 mL  Once As Needed         03/03/24 1104    03/03/24 1104  busPIRone (BUSPAR) tablet 5 mg  3 Times Daily PRN         03/03/24 1104    03/03/24 0830  oxytocin (PITOCIN) 30 units in 0.9% sodium chloride 500 mL (premix)  Continuous        Placed in \"Followed by\" Linked Group    03/03/24 0727    03/03/24 0815  oxytocin (PITOCIN) 30 units in 0.9% sodium chloride 500 mL (premix)  Once,   Status:  Discontinued        Placed in \"Followed by\" Linked Group    03/03/24 0727    03/03/24 0802  VTE Prophylaxis Not Indicated: No Risk Factors (0); </= 3 (Low Risk)  Once         03/03/24 0801    03/03/24 0801  Transfer Patient  Once         03/03/24 0801    03/03/24 0728  Notify Physician (specified)  Until Discontinued,   Status:  Canceled         03/03/24 0727 03/03/24 0728  Vital Signs Per Hospital Policy  Per Hospital Policy,   Status:  Canceled         03/03/24 0727    03/03/24 0728  Up Ad Ashanti  Until Discontinued,   Status:  Canceled         03/03/24 0727    03/03/24 0728  Fundal & Lochia Check  Per Order Details,   Status:  Canceled        Comments: Every 15 Minutes x4, Then Every 30 Minutes x2, Then Every Shift    03/03/24 0727    03/03/24 0728  Diet: Regular/House Diet; Texture: Regular Texture (IDDSI 7); " "Fluid Consistency: Thin (IDDSI 0)  Diet Effective Now,   Status:  Canceled         03/03/24 0727    03/03/24 0728  Nurse may remove epidural catheter after delivery.  Continuous,   Status:  Canceled         03/03/24 0727 03/03/24 0728  Transfer to Postpartum When Criteria Met  Until Discontinued,   Status:  Canceled         03/03/24 0727 03/03/24 0727  Strict Intake and Output  Every Shift,   Status:  Canceled       03/03/24 0727 03/03/24 0727  Fundal & Lochia Check  Every Shift,   Status:  Canceled       03/03/24 0727 03/03/24 0727  Apply Ice to Perineum  As Needed,   Status:  Canceled       03/03/24 0727 03/03/24 0727  Bladder Assessment  As Needed,   Status:  Canceled       03/03/24 0727 03/03/24 0727  ibuprofen (ADVIL,MOTRIN) tablet 600 mg  Every 6 Hours PRN,   Status:  Discontinued         03/03/24 0727 03/03/24 0727  HYDROmorphone (DILAUDID) injection 1 mg  Every 2 Hours PRN,   Status:  Discontinued         03/03/24 0727 03/03/24 0727  methylergonovine (METHERGINE) injection 200 mcg  Once As Needed,   Status:  Discontinued         03/03/24 0727 03/03/24 0727  carboprost (HEMABATE) injection 250 mcg  As Needed,   Status:  Discontinued         03/03/24 0727 03/03/24 0727  miSOPROStol (CYTOTEC) tablet 800 mcg  As Needed,   Status:  Discontinued         03/03/24 0727 03/03/24 0727  ondansetron ODT (ZOFRAN-ODT) disintegrating tablet 4 mg  Every 6 Hours PRN,   Status:  Discontinued        Placed in \"Or\" Linked Group    03/03/24 0727 03/03/24 0727  ondansetron (ZOFRAN) injection 4 mg  Every 6 Hours PRN,   Status:  Discontinued        Placed in \"Or\" Linked Group    03/03/24 0727 03/03/24 0727  calcium carbonate (TUMS) chewable tablet 500 mg (200 mg elemental)  3 Times Daily PRN,   Status:  Discontinued         03/03/24 0727 03/03/24 0700  oxytocin (PITOCIN) 30 units in 0.9% sodium chloride 500 mL (premix)  Titrated,   Status:  Discontinued         03/03/24 0611    03/03/24 " 0630  Sod Citrate-Citric Acid (BICITRA) oral solution 30 mL  Once,   Status:  Discontinued         03/03/24 0538    03/03/24 0539  Start IV with #16 or #18 gauge angiocath.  Once,   Status:  Canceled         03/03/24 0538    03/03/24 0539  Fetal Heart Rate Monitor  Once,   Status:  Canceled         03/03/24 0538    03/03/24 0539  Facilitate maternal postion on side and maintain uterine displacement.  Until Discontinued,   Status:  Canceled         03/03/24 0538    03/03/24 0539  Consult anesthesia services prior to changing epidural infusion/rate.  Until Discontinued,   Status:  Canceled         03/03/24 0538    03/03/24 0538  ePHEDrine injection 10 mg  Every 5 Minutes PRN,   Status:  Discontinued         03/03/24 0538 03/03/24 0000  Vital Signs q 4 while awake  Every 4 Hours,   Status:  Canceled      Comments: While the patient is awake.    03/02/24 2259 03/02/24 2345  sodium chloride 0.9 % flush 10 mL  Every 12 Hours Scheduled,   Status:  Discontinued         03/02/24 2259 03/02/24 2345  lactated ringers infusion  Continuous,   Status:  Discontinued         03/02/24 2259 03/02/24 2256  VTE Prophylaxis Not Indicated: No Risk Factors (0); </= 3 (Low Risk)  Once         03/02/24 2259 03/02/24 2255  Admit To Obstetrics Inpatient  Once         03/02/24 2259 03/02/24 2255  Code Status and Medical Interventions:  Continuous,   Status:  Canceled         03/02/24 2259 03/02/24 2255  Obtain informed consent  Once,   Status:  Canceled         03/02/24 2259 03/02/24 2255  Vital Signs Per Hospital Policy  Per Hospital Policy,   Status:  Canceled         03/02/24 2259 03/02/24 2255  Confirm presence of amniotic fluid  Once,   Status:  Canceled        Comments: If patient present with SROM    03/02/24 2259 03/02/24 2255  Continuous Fetal Monitoring With NST on Admission and Prior to Initiation of Oxytocin.  Per Order Details,   Status:  Canceled        Comments: Continuous Fetal Monitoring With NST  on Admission & Prior to Initiation of Oxytocin.    24  External Uterine Contraction Monitoring  Per Hospital Policy,   Status:  Canceled         24  Notify Physician (specified)  Until Discontinued,   Status:  Canceled         24  Notify physician for tachysystole (per hospital algorithm)  Until Discontinued,   Status:  Canceled         24  Notify physician if membranes ruptured, bleeding greater than 1 pad an hour, fetal heart tone abnormality, and severe pain  Until Discontinued,   Status:  Canceled         24  Up Ad Ashanti  Until Discontinued,   Status:  Canceled         24  Intake and Output  Every Shift,   Status:  Canceled      Comments: Every shift if on IV Tocolytics.    24  Cervical Exam  Once,   Status:  Canceled        Comments: Unless contraindicated, every 1-2 hours in active labor, or at nurse's discretion.    24  Initiate Group Beta Strep (GBS) Prophylaxis Protocol, If Criteria Met  Continuous,   Status:  Canceled        Comments: NO TREATMENT RECOMMENDED IF: 1)  Maternal GBS status known negative 2)  Scheduled  birth with intact membranes, not in labor.  3 ) Maternal GBS unknown, no risk factors.   TREAT WITH ANTIBIOTICS IF:  1)  Maternal GBS status is known postive.  2)  Maternal GBS status unknown with these risk factors:  a)  Previous infant affected by GBS infection.  b)  GBS urinary tract infection (UTI) or bacteruria during pregnancy  c)  Unexplained maternal fever in labor (greater than or equal to 100.4F or 38.0C)  d)  Prolonged rupture of the membranes greater than or equal to 18 hours.  e)  Gestational age less than 37 weeks.    24  NPO Diet NPO Type: Ice Chips  Diet Effective Now,   Status:  Canceled         24  "2255  Inpatient Anesthesiology Consult  Once,   Status:  Canceled        Specialty:  Anesthesiology  Provider:  (Not yet assigned)    03/02/24 2259 03/02/24 2255  T Pallidum Antibody w/ reflex RPR (Syphilis)  Once         03/02/24 2259 03/02/24 2255  CBC & Differential  Once         03/02/24 2259 03/02/24 2255  Type & Screen  Once         03/02/24 2259 03/02/24 2255  Insert Peripheral IV  Once,   Status:  Canceled         03/02/24 2259 03/02/24 2255  Saline Lock & Maintain IV Access  Continuous,   Status:  Canceled         03/02/24 2259 03/02/24 2255  CBC Auto Differential  PROCEDURE ONCE         03/02/24 2259 03/02/24 2254  ondansetron ODT (ZOFRAN-ODT) disintegrating tablet 4 mg  Every 6 Hours PRN,   Status:  Discontinued        Placed in \"Or\" Linked Group    03/02/24 2259 03/02/24 2254  ondansetron (ZOFRAN) injection 4 mg  Every 6 Hours PRN,   Status:  Discontinued        Placed in \"Or\" Linked Group    03/02/24 2259 03/02/24 2254  terbutaline (BRETHINE) injection 0.25 mg  As Needed,   Status:  Discontinued         03/02/24 2259 03/02/24 2254  Sod Citrate-Citric Acid (BICITRA) oral solution 30 mL  Once As Needed,   Status:  Discontinued         03/02/24 2259 03/02/24 2254  Position Change - For Intra-Uterine Resusitation for Hypertonus, HyperStimulation or Non-Reassuring Fetal Status  As Needed,   Status:  Canceled       03/02/24 2259 03/02/24 2254  sodium chloride 0.9 % flush 10 mL  As Needed,   Status:  Discontinued         03/02/24 2259 03/02/24 2254  sodium chloride 0.9 % infusion 40 mL  As Needed,   Status:  Discontinued         03/02/24 2259 03/02/24 2254  lactated ringers bolus 1,000 mL  Once As Needed         03/02/24 2259 03/02/24 2254  acetaminophen (TYLENOL) tablet 650 mg  Every 4 Hours PRN,   Status:  Discontinued         03/02/24 2259 03/02/24 2254  butorphanol (STADOL) injection 1 mg  Every 3 Hours PRN,   Status:  Discontinued         03/02/24 2259    " 24 225  butorphanol (STADOL) injection 2 mg  Every 3 Hours PRN,   Status:  Discontinued         24    Unscheduled  Apply Ice to Perineum  As Needed      Comments: For 20 min q 2 hrs    24 1104    Unscheduled  Waffle Cushion  As Needed      Comments: For perineal discomfort    24 1104    Unscheduled  Donut Ring  As Needed      Comments: For perineal pain    24 1104    Unscheduled  Kpad  As Needed      Comments: For pain    24 1104    Unscheduled  Apply witch hazel pads / TUCKS to perineum as needed for comfort PRN  As Needed       24 1104    Unscheduled  Warm compress  As Needed       24 1104    Unscheduled  Apply ace wrap, tight bra, or binder  As Needed       24 1104    Unscheduled  Apply ice packs  As Needed       24 1104    Signed and Held  famotidine (PEPCID) injection 20 mg  Once As Needed         Signed and Held                     Operative/Procedure Notes (all)        Salvador Pineda MD at 24 0801  Version 1 of 1          [facility]   Vaginal Delivery Note    Patient Name: Lisset Mckinnon  : 2000  MRN: [id number]    Date of Delivery: 3/3/2024     Diagnosis     Pre & Post-Delivery:  Intrauterine pregnancy at 38w0d  Labor status: Spontaneous Onset of Labor     Normal labor and delivery    Full-term premature rupture of membranes             Problem List    Transfer to Postpartum     Review the Delivery Report for details.     Delivery     Delivery: Vaginal, Spontaneous     YOB: 2022    Time of Birth:  Gestational Age 7:41 AM   38w0d     Anesthesia: Epidural     Delivering clinician: Salvador Pineda    Forceps?   No   Vacuum? No    Shoulder dystocia present: No        Delivery narrative:  Progressed to C/C/+2 and pushed well to deliver a LVIM. FREIDA to LOT vigorous to mom's abdomen. Cord clamping delayed for 60 seconds. Placenta spontaneous and intact with 3VC after IV Pitocin. Tiny 1st degree perineal  "laceration repaired with 3-0 Vicryl Rapide. Epidural anes. EBL 100mL. No complications. Sponge and needle count correct.       Infant     Findings: male  infant     Infant observations: Weight: No birth weight on file.   Length:   in  Observations/Comments:        Apgars: 8  @ 1 minute /    9  @ 5 minutes   Infant Name:      Placenta & Cord         Placenta delivered  Spontaneous  at   3/3/2024  7:44 AM     Cord: 3 vessels  present.   Nuchal Cord?  no   Cord blood obtained: Yes    Cord gases obtained:  No    Cord gas results: Venous:  No results found for: \"PHCVEN\", \"BECVEN\"    Arterial:  No results found for: \"PHCART\", \"BECART\"     Repair     Episiotomy: None     No    Lacerations: Yes  Laceration Information  Laceration Repaired?   Perineal: 1st  Yes    Periurethral:       Labial:       Sulcus:       Vaginal:       Cervical:         Suture used for repair: 3-0 Vicryl     Estimated Blood Loss:       Quantitative Blood Loss:    QBL from VAG DEL: 112 (03/03/24 0748)     Complications     none    Disposition     Mother to Mother Baby/Postpartum  in stable condition currently.  Baby to remains with mom  in stable condition currently.    Salvador Pineda MD  03/03/24  08:01 CST          Electronically signed by Salvador Pineda MD at 03/03/24 0802       Physician Progress Notes (last 48 hours)  Notes from 03/01/24 1659 through 03/03/24 1659   No notes of this type exist for this encounter.       "

## 2024-03-03 NOTE — L&D DELIVERY NOTE
" [facility]   Vaginal Delivery Note    Patient Name: Lisset Mckinnon  : 2000  MRN: [id number]    Date of Delivery: 3/3/2024     Diagnosis     Pre & Post-Delivery:  Intrauterine pregnancy at 38w0d  Labor status: Spontaneous Onset of Labor     Normal labor and delivery    Full-term premature rupture of membranes             Problem List    Transfer to Postpartum     Review the Delivery Report for details.     Delivery     Delivery: Vaginal, Spontaneous     YOB: 2022    Time of Birth:  Gestational Age 7:41 AM   38w0d     Anesthesia: Epidural     Delivering clinician: Salvador Pineda    Forceps?   No   Vacuum? No    Shoulder dystocia present: No        Delivery narrative:  Progressed to C/C/+2 and pushed well to deliver a LVIM. FREIDA to LOT vigorous to mom's abdomen. Cord clamping delayed for 60 seconds. Placenta spontaneous and intact with 3VC after IV Pitocin. Tiny 1st degree perineal laceration repaired with 3-0 Vicryl Rapide. Epidural anes. EBL 100mL. No complications. Sponge and needle count correct.       Infant     Findings: male  infant     Infant observations: Weight: No birth weight on file.   Length:   in  Observations/Comments:        Apgars: 8  @ 1 minute /    9  @ 5 minutes   Infant Name:      Placenta & Cord         Placenta delivered  Spontaneous  at   3/3/2024  7:44 AM     Cord: 3 vessels  present.   Nuchal Cord?  no   Cord blood obtained: Yes    Cord gases obtained:  No    Cord gas results: Venous:  No results found for: \"PHCVEN\", \"BECVEN\"    Arterial:  No results found for: \"PHCART\", \"BECART\"     Repair     Episiotomy: None     No    Lacerations: Yes  Laceration Information  Laceration Repaired?   Perineal: 1st  Yes    Periurethral:       Labial:       Sulcus:       Vaginal:       Cervical:         Suture used for repair: 3-0 Vicryl     Estimated Blood Loss:       Quantitative Blood Loss:    QBL from VAG DEL: 112 (24 0748)     Complications     none    Disposition "     Mother to Mother Baby/Postpartum  in stable condition currently.  Baby to remains with mom  in stable condition currently.    Salvador Pineda MD  03/03/24  08:01 CST

## 2024-03-03 NOTE — ANESTHESIA PREPROCEDURE EVALUATION
Anesthesia Evaluation     NPO Solid Status: N/A  NPO Liquid Status: N/A           Airway   Mallampati: II  TM distance: >3 FB  Dental - normal exam     Pulmonary - negative pulmonary ROS   Cardiovascular   Exercise tolerance: good (4-7 METS)        Neuro/Psych  (+) psychiatric history Depression  GI/Hepatic/Renal/Endo      Musculoskeletal     Abdominal    Substance History      OB/GYN    (+) Pregnant        Other                    Anesthesia Plan    ASA 2     epidural       Anesthetic plan, risks, benefits, and alternatives have been provided, discussed and informed consent has been obtained with: patient and spouse/significant other.    CODE STATUS:    Code Status (Patient has no pulse and is not breathing): CPR (Attempt to Resuscitate)  Medical Interventions (Patient has pulse or is breathing): Full Support

## 2024-03-03 NOTE — PLAN OF CARE
Goal Outcome Evaluation:            Afebrile , slightly low BP, pt states they are normal for her.  Fundus F, ML, UU.  Small lochia.  Pt given comfort products for laceration.  Pt is breastfeeding and using breast pump.  Bonding appr with infant.  PO Motrin controlling pain.

## 2024-03-03 NOTE — H&P
[facility]  Lisset Mckinnon  : 2000  MRN: [id number]  CSN: [id number]    History and Physical    Subjective   Lisset Mckinnon is a 23 y.o. year old  with an Estimated Date of Delivery: 3/17/24 currently at 38w0d presenting with leaking fluid.    Prenatal care has been with Dr. Anushka Chan.  It has been benign.    OB History    Para Term  AB Living   2 1 1 0 0 1   SAB IAB Ectopic Molar Multiple Live Births   0 0 0 0 0 1      # Outcome Date GA Lbr Paul/2nd Weight Sex Type Anes PTL Lv   2 Current            1 Term 23 39w0d   F Vag-Spont EPI N BILL     Past Medical History:   Diagnosis Date    Depression      Past Surgical History:   Procedure Laterality Date    WISDOM TOOTH EXTRACTION         Current Facility-Administered Medications:     acetaminophen (TYLENOL) tablet 650 mg, 650 mg, Oral, Q4H PRN, Salvador Pineda MD    butorphanol (STADOL) injection 1 mg, 1 mg, Intravenous, Q3H PRN, Salvador Pineda MD    butorphanol (STADOL) injection 2 mg, 2 mg, Intravenous, Q3H PRN, Salvador Pineda MD, 2 mg at 24 0222    calcium carbonate (TUMS) chewable tablet 500 mg (200 mg elemental), 2 tablet, Oral, TID PRN, Salvador Pineda MD    carboprost (HEMABATE) injection 250 mcg, 250 mcg, Intramuscular, PRN, Salvador Pineda MD    ePHEDrine injection 10 mg, 10 mg, Intravenous, Q5 Min PRN, Koffi Raymundo CRNA    HYDROmorphone (DILAUDID) injection 1 mg, 1 mg, Intravenous, Q2H PRN, Salvador Pineda MD    ibuprofen (ADVIL,MOTRIN) tablet 600 mg, 600 mg, Oral, Q6H PRN, Salvador Pineda MD    lactated ringers infusion, 125 mL/hr, Intravenous, Continuous, Salvador Pineda MD, Stopped at 24 0744    methylergonovine (METHERGINE) injection 200 mcg, 200 mcg, Intramuscular, Once PRN, Salvador Pineda MD    miSOPROStol (CYTOTEC) tablet 800 mcg, 800 mcg, Rectal, PRN, Jefferson, Salvador WINKLER MD    ondansetron ODT (ZOFRAN-ODT) disintegrating tablet 4 mg, 4 mg,  "Oral, Q6H PRN **OR** ondansetron (ZOFRAN) injection 4 mg, 4 mg, Intravenous, Q6H PRN, Salvador Pineda MD    oxytocin (PITOCIN) 30 units in 0.9% sodium chloride 500 mL (premix), 999 mL/hr, Intravenous, Once **FOLLOWED BY** oxytocin (PITOCIN) 30 units in 0.9% sodium chloride 500 mL (premix), 250 mL/hr, Intravenous, Continuous, Salvador Pineda MD    oxytocin (PITOCIN) 30 units in 0.9% sodium chloride 500 mL (premix), 2-20 julita-units/min, Intravenous, Titrated, Salvador Pineda MD, Last Rate: 250 mL/hr at 03/03/24 0755, 250 julita-units/min at 03/03/24 0755    Sod Citrate-Citric Acid (BICITRA) oral solution 30 mL, 30 mL, Oral, Once PRN, Salvador Pineda MD    Sod Citrate-Citric Acid (BICITRA) oral solution 30 mL, 30 mL, Oral, Once, Koffi Raymundo CRNA    sodium chloride 0.9 % flush 10 mL, 10 mL, Intravenous, Q12H, Salvador Pineda MD    sodium chloride 0.9 % flush 10 mL, 10 mL, Intravenous, PRN, Salvador Pineda MD    sodium chloride 0.9 % infusion 40 mL, 40 mL, Intravenous, PRN, Salvador Pineda MD    terbutaline (BRETHINE) injection 0.25 mg, 0.25 mg, Subcutaneous, PRN, Salvador Pineda MD    Allergies   Allergen Reactions    Dog Epithelium Rash    Dust Mite Extract Shortness Of Breath    Rabbit Epithelium Hives    Mouse Epithelium Allergy Skin Test Hives    Latex Rash     Social History    Tobacco Use      Smoking status: Never      Smokeless tobacco: Never    Review of Systems      Objective   /60 (BP Location: Left arm, Patient Position: Sitting)   Pulse 97   Temp 98.2 °F (36.8 °C) (Oral)   Resp 16   Ht 175.3 cm (69\")   Wt 72.7 kg (160 lb 3.2 oz)   LMP 06/01/2023 (Exact Date)   SpO2 100%   BMI 23.66 kg/m²   General: well developed; well nourished  no acute distress   Heart: regular rate and rhythm   Lungs: breathing is unlabored   Abdomen: soft, non-tender; no masses   FHT's: reactive   Cervix: was checked (by RN): 2 cm / 70 % / -2   Presentation: cephalic "   Contractions:  EFW by Leopold's:  EFW by recent u/s: irregular           Prenatal Labs  Lab Results   Component Value Date    HGB 11.7 (L) 03/02/2024    HEPBSAG Negative 07/28/2023    ABORH O POS 06/05/2021    ABO O 03/02/2024    RH Positive 03/02/2024    ABSCRN Negative 03/02/2024    LPO4QVB2 Non Reactive 07/28/2023    HEPCVIRUSABY Non Reactive 07/28/2023    URINECX No growth 02/05/2024       Current Labs Reviewed   No data reviewed       Assessment   IUP at 38w0d  Fetus reassuring  Group B strep status: negative  Ruptured membranes  Early labor     Plan   Admit to L&D, augment with pitocin, epidural prn    Salvador Pineda MD  3/3/2024  07:59 CST

## 2024-03-03 NOTE — PLAN OF CARE
Goal Outcome Evaluation:  Plan of Care Reviewed With: patient, significant other        Progress: improving  Outcome Evaluation:  of infant male.  1st degree laceration with repair.  Patient had epidural.  FF/midline/UU.  Light rubra lochia, no clots present.  Patient up to bathroom with standby assist and urinated.  VSS.  Patient breastfeeding and bonding well with infant.  Significant other and family at bedside.

## 2024-03-03 NOTE — ANESTHESIA PROCEDURE NOTES
Labor Epidural      Patient location during procedure: OB  Start Time: 3/3/2024 5:11 AM  Stop Time: 3/3/2024 5:16 AM  Indication:at surgeon's request  Performed By  CRNA/CAA: Koffi Raymundo CRNA  Preanesthetic Checklist  Completed: patient identified, IV checked, site marked, risks and benefits discussed, surgical consent, monitors and equipment checked, pre-op evaluation and timeout performed  Prep:  Pt Position:sitting  Sterile Tech:gloves, mask and cap  Prep:DuraPrep  Monitoring:continuous pulse oximetry and blood pressure monitoring  Epidural Block Procedure:  Approach:midline  Location:L4-L5  Needle Type:Tuohy  Needle Gauge:17 G  Loss of Resistance Medium: saline  Loss of Resistance: 8cm  Cath Depth at skin:15 cm  Paresthesia: none  Aspiration:negative  Test Dose:negative  Number of Attempts: 1  Post Assessment:  Dressing:secured with tape and occlusive dressing applied  Pt Tolerance:patient tolerated the procedure well with no apparent complications  Complications:no

## 2024-03-04 LAB
HCT VFR BLD AUTO: 34.8 % (ref 34–46.6)
HGB BLD-MCNC: 11.5 G/DL (ref 12–15.9)

## 2024-03-04 PROCEDURE — 85018 HEMOGLOBIN: CPT | Performed by: OBSTETRICS & GYNECOLOGY

## 2024-03-04 PROCEDURE — 85014 HEMATOCRIT: CPT | Performed by: OBSTETRICS & GYNECOLOGY

## 2024-03-04 PROCEDURE — 0503F POSTPARTUM CARE VISIT: CPT | Performed by: OBSTETRICS & GYNECOLOGY

## 2024-03-04 RX ORDER — GUAIFENESIN 600 MG/1
600 TABLET, EXTENDED RELEASE ORAL EVERY 12 HOURS SCHEDULED
Status: DISCONTINUED | OUTPATIENT
Start: 2024-03-04 | End: 2024-03-05 | Stop reason: HOSPADM

## 2024-03-04 RX ADMIN — GUAIFENESIN 600 MG: 600 TABLET, EXTENDED RELEASE ORAL at 20:42

## 2024-03-04 RX ADMIN — PRENATAL VIT W/ FE FUMARATE-FA TAB 27-0.8 MG 1 TABLET: 27-0.8 TAB at 09:00

## 2024-03-04 RX ADMIN — GUAIFENESIN 600 MG: 600 TABLET, EXTENDED RELEASE ORAL at 09:00

## 2024-03-04 RX ADMIN — IBUPROFEN 600 MG: 600 TABLET, FILM COATED ORAL at 01:12

## 2024-03-04 RX ADMIN — ACETAMINOPHEN 650 MG: 325 TABLET, FILM COATED ORAL at 05:00

## 2024-03-04 RX ADMIN — IBUPROFEN 600 MG: 600 TABLET, FILM COATED ORAL at 11:27

## 2024-03-04 RX ADMIN — IBUPROFEN 600 MG: 600 TABLET, FILM COATED ORAL at 19:36

## 2024-03-04 RX ADMIN — DOCUSATE SODIUM 100 MG: 100 CAPSULE, LIQUID FILLED ORAL at 09:00

## 2024-03-04 RX ADMIN — DOCUSATE SODIUM 100 MG: 100 CAPSULE, LIQUID FILLED ORAL at 20:42

## 2024-03-04 RX ADMIN — ACETAMINOPHEN 650 MG: 325 TABLET, FILM COATED ORAL at 21:19

## 2024-03-04 RX ADMIN — GUAIFENESIN 600 MG: 600 TABLET, EXTENDED RELEASE ORAL at 01:12

## 2024-03-04 NOTE — PLAN OF CARE
Goal Outcome Evaluation:  Plan of Care Reviewed With: patient, significant other   VSS, fundus firm, midline U1, lochia scant, voiding without difficulty, Motrin given once related to abd cramping, tolerating activities well, bonding well with infant, EPDS 5. Patient refused TDAP vaccine.     Progress: improving

## 2024-03-04 NOTE — PROGRESS NOTES
"      Jesus Vega MD  [facility]  [address]  [place] [address]  Office [phone]  Fax [phone]    [facility]  Vaginal Delivery Progress Note    Subjective   Postpartum Day 1: Vaginal Delivery    The patient feels well.  Her pain is well controlled with  ERAS protocol .   She is ambulating well.  Patient describes her bleeding as thin lochia. Sore/horse throat. Mucinex helping. Denies contact with ill individuals.     Breastfeeding: infant latching with difficulty.    Objective     Vital Signs Range for the last 24 hours  Temperature: Temp:  [97.7 °F (36.5 °C)-99.1 °F (37.3 °C)] 97.9 °F (36.6 °C)   Temp Source: Temp src: Temporal   BP: BP: ()/(48-87) 103/49   Pulse: Heart Rate:  [] 78   Respirations: Resp:  [15-18] 18   SPO2: SpO2:  [96 %-100 %] 97 %   O2 Amount (l/min):     O2 Devices Device (Oxygen Therapy): room air   Weight:       Admit Height:  Height: 175.3 cm (69\")      Physical Exam:  General:  no acute distresss.  Abdomen: abdomen is soft without significant tenderness, masses, organomegaly or guarding. Fundus: appropriate, firm, non tender  Extremities: normal, atraumatic, no cyanosis, and trace edema. Negative calf tenderness    Lab results reviewed:  Yes     Prenatal Labs  Lab Results   Component Value Date    HGB 11.5 (L) 03/04/2024    RUBELLAABIGG 3.43 07/28/2023    HEPBSAG Negative 07/28/2023    ABORH O POS 06/05/2021    ABSCRN Negative 03/02/2024    OOF4LMP2 Non Reactive 07/28/2023    HEPCVIRUSABY Non Reactive 07/28/2023     12/19/2023    STREPGPB Negative 02/21/2024    URINECX No growth 02/05/2024    CHLAMNAA Negative 02/21/2024    NGONORRHON Negative 02/21/2024       Immunizations:   Immunization History   Administered Date(s) Administered    COVID-19 (MODERNA) 1st,2nd,3rd Dose Monovalent 05/10/2022, 06/07/2022    DTaP / Hep B / IPV 08/11/2005    DTaP, Unspecified 03/27/2003, 06/09/2003, 07/06/2006    Fluzone (or Fluarix & Flulaval for VFC) >6mos " 10/20/2023    HPV Quadrivalent 08/04/2014, 07/08/2015    Hep A, 2 Dose 01/02/2018, 08/03/2018    Hep B / HiB 03/27/2003    Hep B, Unspecified 2002, 03/27/2003, 08/11/2005    Hib (PRP-OMP) 06/09/2003, 08/11/2005    IPV 03/27/2003, 06/09/2003, 01/30/2007    Influenza Injectable Mdck Pf Quad 10/13/2022    MCV4 Unspecified 07/08/2015    MMR 08/11/2005, 01/30/2007    Meningococcal MCV4P (Menactra) 08/01/2019    PPD Test 05/16/2022, 06/08/2022    Tdap 08/04/2014    Varicella 08/11/2005, 01/30/2007     Lab Results (last 24 hours)       Procedure Component Value Units Date/Time    Hemoglobin & Hematocrit, Blood [[id number]]  (Abnormal) Collected: 03/04/24 0527    Specimen: Blood Updated: 03/04/24 0539     Hemoglobin 11.5 g/dL      Hematocrit 34.8 %     T Pallidum Antibody w/ reflex RPR (Syphilis) [[id number]]  (Normal) Collected: 03/02/24 2305    Specimen: Blood Updated: 03/03/24 1207     Treponemal AB Total Non-Reactive            CBC          2/12/2024    10:02 3/2/2024    23:05 3/4/2024    05:27   CBC   WBC  10.73     RBC  3.70     Hemoglobin 12.1  11.7  11.5    Hematocrit  34.4  34.8    MCV  93.0     MCH  31.6     MCHC  34.0     RDW  12.9     Platelets  248             Assessment & Plan       Normal labor and delivery    Full-term premature rupture of membranes        Lisset Mckinnon is Day 1  post-partum  Vaginal, Spontaneous   .      Plan:  Continue current care.      Jesus Vega MD  3/4/2024  07:13 CST

## 2024-03-04 NOTE — PLAN OF CARE
Goal Outcome Evaluation:  Plan of Care Reviewed With: patient, significant other        Progress: improving  Outcome Evaluation: VSS. FFMLU1 scant lochia. 1st degree lac w/ repair, comfort products in room. Pain controlled w/ Motrin and Tylenol. Ambulating and voiding w/o difficulty. Pt c/o sore throat and coughing, Mucinex scheduled BID. Breastfeeding and pumping, doing well. Bonding well w/ infant.

## 2024-03-04 NOTE — LACTATION NOTE
"Mother's Name: Lisset Phone #:[phone]  Infant Name: Jamey  :3/3/2022  Gestation:38w0d  Day of life:1  Birth weight:  7-2.6 (3250g) Discharge weight:  Weight Loss: -4.15%  24 hour Summary of Feeds: 5BF EBM 63 ml  Voids: 6 Stools:7  Assistive devices (shields, shells, etc):NA  Significant Maternal history:, depression, breastfeed 2.6 yo for 4 months until milk supply dried up after starting on BC and Zoloft  Maternal Concerns:  Infant getting enough at breast  Maternal Goal: breastfeed/pump  Mother's Medications: Buspar as needed, PNV  Breastpump for home: Has Momcozy and Spectra S2  Ped follow up appt:Dr. Del Rio    Visit with mother to provide education and offer support. Mother currently pumping with momcozy. Feeding plan unclear at this time, mother voices concerns infant is not effectively feeding at breast so has been pumping and bottle feeding. Mother reports \"just getting 8-10 mls\". Reiterated to mother expected collection amounts as well as average feeding amounts at just 1 day, mother and baby exceeding expectatations. Praise provided. Encouraged continued attempts at breastfeeding if her goal is to continue breastfeeding. Encourage bfing first and then may attempt to to pump and offer supplemental feeding if mother feels infant's efforts at breast suboptimal. Reviewed infants feedings, voids, stools and weight loss. Praised for signs of success thus far. Mother denies further questions or concerns. Initial breastfeeding packet and breastfeeding book provided. Mother states attempting to feed infant every 2 hours. Reiterated importance of sleep and encouraged mother to allow infant to sleep to 3 hours before attempting to wake and feed, pointing out, longer rest may improve infant's eagerness and activity at breast as well. Still encourage on demand feedings, but should allow up to 3 hours before waking infant. Mother voices understanding. Questions denied. Encouragement and support provided. "     Instructed patient our lactation team is here for continued support throughout their breastfeeding journey. Our team has encouraged patient to call with any questions or concerns that may arise after discharge.     Breastfeeding and Diaper Chart  Check List for Essentials of Positioning And Latch-on handout provided by L[employer]  Hand Expression handout provided by [employer]  Five Keys to Successful Breastfeeding handout provided by [employer]    The Many Benefits if Breastfeeding handout given  Breastfeeding saves time  *Breastfeeding allows you to calm or feed your baby immediately, which leads to a happier baby who cries less  *There is nothing to buy, prepare, or maintain.There is nothing to clean or sterilize.  Breastfeeding builds a mothers confidence  *She knows all her baby needs to thrive is her!  Breastfeeding saves Money  *There is no formula to buy and healthier breast fed babies have less medical costs  Healthy Mom/Healthy baby  * babies get sick less often, and when they do they are usually sick less severely and for a shorter time  * babies have fewer ear infections  * babies have fewer allergies  *Mothers who breastfeed have a lower risk for cancer, osteoporosis, anemia, high blood pressure, obesity, and Type ll diabetes  *Mothers miss less work days with sick babies  Breast fed babies have a better dental health  * babies have better jaw development which requires lest orthodontic work  *Breast milk does not promote cavities  * babies can nurse at night without worry of tooth decay  Breastfeeding allows a baby to reach his full IQ potential  *The longer a baby is breast fed, the better their brain development  Breast fed babies and moms are more relaxed  *The hormones released during breastfeeding have a calming effect on mothers  *Breastfeeding requires mom to take a break; this may help mom  get more rest after delivery  *Breastfeeding is quicker than preparing formula which allows mom and baby to get back to sleep faster  *Breastfeeding promotes bonding and allows mom to learn babies cues and care needs more quickly  Breastfeeding cleanup is easier  *The bowel movements and spit up of breast fed babies doesn't smell as bad  *Spit-up of breast fed babies doesn't stain clothing  Getting out of the hourse is easier  *No formula bottles to prepare and carry safely   *No time restraints due to worry about what baby will eat  *No worries about warming a bottle or finding safe water to prepare bottles  Breastfeeding mother get their bodies back sooner  *The uterus shrinks more quickly and completely, which allows a flatter tummy  *Breastfeeding burns 400-500 calories a day; making milk torches stored fat!  Breastfeeding is better for the environment  *There is no trash to dispose of after breastfeeding  *There is no production facility to produce breast milk; moms body does it all without the pollution of a factory      Your Guide to Breastfeeding Booklet by [employer], www.[employer]      Safe Storage of Breastmilk magnet: [employer]

## 2024-03-04 NOTE — ANESTHESIA POSTPROCEDURE EVALUATION
"Patient: Lisset Mckinnon    Procedure Summary       Date: 03/03/24 Room / Location:     Anesthesia Start: 0506 Anesthesia Stop: 0741    Procedure: LABOR ANALGESIA Diagnosis:     Scheduled Providers:  Provider: Koffi Raymundo CRNA    Anesthesia Type: epidural ASA Status: 2            Anesthesia Type: epidural    Vitals  Vitals Value Taken Time   BP 97/61 03/04/24 0718   Temp 97.1 °F (36.2 °C) 03/04/24 0718   Pulse 68 03/04/24 0718   Resp 18 03/04/24 0718   SpO2 98 % 03/04/24 0718           Post Anesthesia Care and Evaluation    Patient location during evaluation: PHASE II  Patient participation: complete - patient participated  Level of consciousness: awake and alert  Pain management: adequate    Airway patency: patent  Anesthetic complications: No anesthetic complications  PONV Status: none  Cardiovascular status: acceptable  Respiratory status: acceptable  Hydration status: acceptable  Post Neuraxial Block status: Motor and sensory function returned to baseline and No signs or symptoms of PDPH  Comments: Blood pressure 97/61, pulse 68, temperature 97.1 °F (36.2 °C), temperature source Temporal, resp. rate 18, height 175.3 cm (69\"), weight 72.7 kg (160 lb 3.2 oz), last menstrual period 06/01/2023, SpO2 98%, currently breastfeeding.    No anesthesia care post op    "

## 2024-03-05 ENCOUNTER — PATIENT OUTREACH (OUTPATIENT)
Dept: LABOR AND DELIVERY | Facility: HOSPITAL | Age: 24
End: 2024-03-05
Payer: COMMERCIAL

## 2024-03-05 VITALS
RESPIRATION RATE: 18 BRPM | BODY MASS INDEX: 23.73 KG/M2 | DIASTOLIC BLOOD PRESSURE: 60 MMHG | HEART RATE: 73 BPM | OXYGEN SATURATION: 98 % | WEIGHT: 160.2 LBS | SYSTOLIC BLOOD PRESSURE: 106 MMHG | HEIGHT: 69 IN | TEMPERATURE: 98.3 F

## 2024-03-05 PROBLEM — O42.92 FULL-TERM PREMATURE RUPTURE OF MEMBRANES: Status: RESOLVED | Noted: 2024-03-02 | Resolved: 2024-03-05

## 2024-03-05 PROCEDURE — 0503F POSTPARTUM CARE VISIT: CPT | Performed by: OBSTETRICS & GYNECOLOGY

## 2024-03-05 RX ADMIN — DOCUSATE SODIUM 100 MG: 100 CAPSULE, LIQUID FILLED ORAL at 08:00

## 2024-03-05 RX ADMIN — IBUPROFEN 600 MG: 600 TABLET, FILM COATED ORAL at 01:43

## 2024-03-05 RX ADMIN — PRENATAL VIT W/ FE FUMARATE-FA TAB 27-0.8 MG 1 TABLET: 27-0.8 TAB at 08:00

## 2024-03-05 RX ADMIN — ACETAMINOPHEN 650 MG: 325 TABLET, FILM COATED ORAL at 05:04

## 2024-03-05 RX ADMIN — IBUPROFEN 600 MG: 600 TABLET, FILM COATED ORAL at 07:57

## 2024-03-05 NOTE — PAYOR COMM NOTE
"REF:  [id number]     [facility]  DEBBIE,  [phone]  OR  FAX    [phone]      Hermilo Mckinnon (23 y.o. Female)       Date of Birth   2000    Social Security Number       [address] [address]    Home Phone   [phone]    MRN   [id number]       Synagogue   None    Marital Status   Single                            Admission Date   3/2/24    Admission Type   Elective    Admitting Provider   Salvador Pineda MD    Attending Provider       Department, Room/Bed   [facility] MOTHER BABY 2A, M237/1       Discharge Date   3/5/2024    Discharge Disposition   Home or Self Care    Discharge Destination   Home                              Attending Provider: (none)   Allergies: Dog Epithelium, Dust Mite Extract, Rabbit Epithelium, Mouse Epithelium Allergy Skin Test, Latex    Isolation: None   Infection: None   Code Status: CPR    Ht: 175.3 cm (69\")   Wt: 72.7 kg (160 lb 3.2 oz)    Admission Cmt: None   Principal Problem: Normal labor and delivery [O80]                   Active Insurance as of 3/2/2024       Primary Coverage       Payor Plan Insurance Group Employer/Plan Group    [employer] [place] [employer] [place]ICA[place]        Payor Plan Address Payor Plan Phone Number Payor Plan Fax Number Effective Dates    [address] [address] [phone]  11/2/2023 - None Entered    [place] [address]         Subscriber Name Subscriber Birth Date Member ID       HERMILO MCKINNON 2000 [id number]                     Emergency Contacts        (Rel.) Home Phone Work Phone Mobile Phone    VargasSusan (Mother) [phone] -- --    kirby miller (Significant Other) -- -- [phone]                 Physician Progress Notes (last 72 hours)        Anushka Chan MD at 03/05/24 0850          [facility]  Vaginal Delivery Progress Note    Subjective   Postpartum Day 2: Vaginal Delivery    The patient feels well.  Her pain is well controlled with ibuprofen (OTC).   She " "is ambulating well.  Patient describes her bleeding as thin lochia.    Breastfeeding: infant latching without difficulty.    Objective     Vital Signs Range for the last 24 hours  Temperature: Temp:  [97.6 °F (36.4 °C)-98.3 °F (36.8 °C)] 98.3 °F (36.8 °C)   Temp Source: Temp src: Temporal   BP: BP: (106-108)/(60-63) 106/60   Pulse: Heart Rate:  [73-83] 73   Respirations: Resp:  [18] 18   SPO2: SpO2:  [97 %-98 %] 98 %   O2 Amount (l/min):     O2 Devices Device (Oxygen Therapy): room air   Weight:       Admit Height:  Height: 175.3 cm (69\")      Physical Exam:  General:  no acute distresss.  Lungs:  breathing is unlabored  Abdomen: Fundus: appropriate, firm, non tender  Extremities: normal, atraumatic, no cyanosis, and Negative edema.       Lab results reviewed:  Yes  Rubella:  No results found for: \"RUBELLAIGGIN\" Nurse Transcribed from prenatal record --  No components found for: \"EXTRUBELQUAL\"  Rh Status:    RH type   Date Value Ref Range Status   03/02/2024 Positive  Final     Immunizations:   Immunization History   Administered Date(s) Administered    COVID-19 (MODERNA) 1st,2nd,3rd Dose Monovalent 05/10/2022, 06/07/2022    DTaP / Hep B / IPV 08/11/2005    DTaP, Unspecified 03/27/2003, 06/09/2003, 07/06/2006    Fluzone (or Fluarix & Flulaval for VFC) >6mos 10/20/2023    HPV Quadrivalent 08/04/2014, 07/08/2015    Hep A, 2 Dose 01/02/2018, 08/03/2018    Hep B / HiB 03/27/2003    Hep B, Unspecified 2002, 03/27/2003, 08/11/2005    Hib (PRP-OMP) 06/09/2003, 08/11/2005    IPV 03/27/2003, 06/09/2003, 01/30/2007    Influenza Injectable Mdck Pf Quad 10/13/2022    MCV4 Unspecified 07/08/2015    MMR 08/11/2005, 01/30/2007    Meningococcal MCV4P (Menactra) 08/01/2019    PPD Test 05/16/2022, 06/08/2022    Tdap 08/04/2014    Varicella 08/11/2005, 01/30/2007     Lab Results (last 24 hours)       ** No results found for the last 24 hours. **            Assessment & Plan       Normal labor and delivery    Full-term premature " "rupture of membranes      Lisset Mckinnon is Day 2  post-partum  Vaginal, Spontaneous   .      Plan:  Discharge home with standard precautions and return to clinic in 4-6 weeks.      Anushka Chan MD  3/5/2024  08:51 CST    Electronically signed by Anushka Chan MD at 03/05/24 0852       Jesus Vega MD at 03/04/24 0713                Jesus Vega MD  [facility]  [address] [place] [address]  Office [phone]  Fax [phone]    [facility]  Vaginal Delivery Progress Note    Subjective   Postpartum Day 1: Vaginal Delivery    The patient feels well.  Her pain is well controlled with  ERAS protocol .   She is ambulating well.  Patient describes her bleeding as thin lochia. Sore/horse throat. Mucinex helping. Denies contact with ill individuals.     Breastfeeding: infant latching with difficulty.    Objective     Vital Signs Range for the last 24 hours  Temperature: Temp:  [97.7 °F (36.5 °C)-99.1 °F (37.3 °C)] 97.9 °F (36.6 °C)   Temp Source: Temp src: Temporal   BP: BP: ()/(48-87) 103/49   Pulse: Heart Rate:  [] 78   Respirations: Resp:  [15-18] 18   SPO2: SpO2:  [96 %-100 %] 97 %   O2 Amount (l/min):     O2 Devices Device (Oxygen Therapy): room air   Weight:       Admit Height:  Height: 175.3 cm (69\")      Physical Exam:  General:  no acute distresss.  Abdomen: abdomen is soft without significant tenderness, masses, organomegaly or guarding. Fundus: appropriate, firm, non tender  Extremities: normal, atraumatic, no cyanosis, and trace edema. Negative calf tenderness    Lab results reviewed:  Yes     Prenatal Labs  Lab Results   Component Value Date    HGB 11.5 (L) 03/04/2024    RUBELLAABIGG 3.43 07/28/2023    HEPBSAG Negative 07/28/2023    ABORH O POS 06/05/2021    ABSCRN Negative 03/02/2024    BMD5BKO0 Non Reactive 07/28/2023    HEPCVIRUSABY Non Reactive 07/28/2023     12/19/2023    STREPGPB Negative 02/21/2024    URINECX No growth 02/05/2024    CHLAMNAA Negative " 02/21/2024    NGONORRHON Negative 02/21/2024       Immunizations:   Immunization History   Administered Date(s) Administered    COVID-19 (MODERNA) 1st,2nd,3rd Dose Monovalent 05/10/2022, 06/07/2022    DTaP / Hep B / IPV 08/11/2005    DTaP, Unspecified 03/27/2003, 06/09/2003, 07/06/2006    Fluzone (or Fluarix & Flulaval for VFC) >6mos 10/20/2023    HPV Quadrivalent 08/04/2014, 07/08/2015    Hep A, 2 Dose 01/02/2018, 08/03/2018    Hep B / HiB 03/27/2003    Hep B, Unspecified 2002, 03/27/2003, 08/11/2005    Hib (PRP-OMP) 06/09/2003, 08/11/2005    IPV 03/27/2003, 06/09/2003, 01/30/2007    Influenza Injectable Mdck Pf Quad 10/13/2022    MCV4 Unspecified 07/08/2015    MMR 08/11/2005, 01/30/2007    Meningococcal MCV4P (Menactra) 08/01/2019    PPD Test 05/16/2022, 06/08/2022    Tdap 08/04/2014    Varicella 08/11/2005, 01/30/2007     Lab Results (last 24 hours)       Procedure Component Value Units Date/Time    Hemoglobin & Hematocrit, Blood [[id number]]  (Abnormal) Collected: 03/04/24 0527    Specimen: Blood Updated: 03/04/24 0539     Hemoglobin 11.5 g/dL      Hematocrit 34.8 %     T Pallidum Antibody w/ reflex RPR (Syphilis) [[id number]]  (Normal) Collected: 03/02/24 2305    Specimen: Blood Updated: 03/03/24 1207     Treponemal AB Total Non-Reactive            CBC          2/12/2024    10:02 3/2/2024    23:05 3/4/2024    05:27   CBC   WBC  10.73     RBC  3.70     Hemoglobin 12.1  11.7  11.5    Hematocrit  34.4  34.8    MCV  93.0     MCH  31.6     MCHC  34.0     RDW  12.9     Platelets  248             Assessment & Plan       Normal labor and delivery    Full-term premature rupture of membranes        Lisset Mckinnon is Day 1  post-partum  Vaginal, Spontaneous   .      Plan:  Continue current care.      Jesus Vega MD  3/4/2024  07:13 CST      Electronically signed by Jesus Vega MD at 03/04/24 0714          Discharge Summary        Anushka Chan MD at 03/05/24 0906          Discharge Summary    BH  Monet Mckinnon  : 2000  MRN: [id number]  CSN: [id number]    Date of Admission: 3/2/2024   Date of Discharge:  3/5/2024   Delivering Physician: Salvador Pineda        Admission Diagnosis: Full-term premature rupture of membranes [O42.92]   Discharge Diagnosis: Pregnancy at 38w0d - delivered       Procedures: 3/3/2024  - Vaginal, Spontaneous       Hospital Course  Patient is a 23 y.o.  who at 38w0d had a vaginal birth.  Her postpartum course was without complications.  On PPD #2 she was ready for discharge.  She had normal lochia and pain well controlled with oral medications.    Infant  male  fetus weighing 3250 g (7 lb 2.6 oz)   Apgars -  8 @ 1 minute /  9 @ 5 minutes.    Discharge labs  Lab Results   Component Value Date    WBC 10.73 2024    HGB 11.5 (L) 2024    HCT 34.8 2024     2024       Discharge Medications     Discharge Medications        Continue These Medications        Instructions Start Date   busPIRone 5 MG tablet  Commonly known as: BUSPAR   5 mg, Oral, 3 Times Daily PRN      PRENATAL 1 PO   Oral      triamcinolone 0.5 % cream  Commonly known as: KENALOG   1 application , Topical, 2 Times Daily               Discharge Disposition Home or Self Care   Condition on Discharge: good   Follow-up: 6 weeks with George Chan MD  3/5/2024    Electronically signed by Anushka Chan MD at 24 0907

## 2024-03-05 NOTE — LACTATION NOTE
Mother's Name: Lisset Phone #: [phone]  Infant Name: Jamey  : 3/3/2024  Gestation:38w0d  Day of life:1  Birth weight:  7-2.6 (3250g) Discharge weight: 6-12.8 (3085g)  Weight Loss: -5.08%  24 hour Summary of Feeds: 6 BF + EBM 37 ml  Voids: 5 Stools: 3  Assistive devices (shields, shells, etc): NA  Significant Maternal history: , depression, breastfeed 2.4 yo for 4 months until milk supply dried up after starting on BC and Zoloft  Maternal Concerns: None  Maternal Goal: breastfeed/pump  Mother's Medications: Buspar as needed, PNV  Breastpump for home: Has Momcozy and Spectra S2  Ped follow up appt:Dr. Del Rio 2 weeks. APRN 3/6/24.    Patient reports infant is latching and breastfeeding well. States she is pumping after breastfeeding due to her having to leave the baby while in school. Pain with feedings denied. Much encouragement provided for breastfeeding success. Discussed infant weight loss/output and outpatient lactation support. Breastfeeding discharge education provided (see below). Questions/concerns denied at this time.     Instructed patient our lactation team is here for continued support throughout their breastfeeding journey. Our team has encouraged patient to call with any questions or concerns that may arise after discharge.     Breastfeeding After Discharge  Signs of Milk: Fullness, firmness, heaviness of breasts, leaking of milk.  Signs of Good Feed: Breast fullness prior to feed, breasts soft and comfortable after feeding. Infant content after feeding: calm, sleepy, relaxed and without continued hunger cues.  Signs of Plugged Ducts, Engorgement and Mastitis: Plugged ducts (milk entrapment in milk ducts)- small tender knots that often feel like little beans under breast tissue, usually tender. Massage on these areas of concern while breastfeeding or pumping to promote emptying.   Engorgement- fluid or excess milk, breasts become uncomfortably full, tight, firm (compare to the firmness of your  cheek (mild), chin (moderate) or forehead (severe). First line of treatment should be to BREASTFEED, if breasts remain full feeling after a feeding, it may be necessary to pump, ONLY UNTIL BREASTS ARE SOFT AND COMFORTABLE. DO NOT OVER PUMP (complete emptying of breasts can trigger even more milk which will cause continued, recurrent Engorgement).  Mastitis- Infection of the breast tissue, most often caused by plugged ducts that are not adequately treated by emptying, recurrent trauma to nipples or breasts (cracked or bleeding nipples). Signs: redness, swelling, tender knots or fever to breasts as well as generalized fever >101 degrees F that is often sudden onset. Treatment of mastitis, BREASTFEED! Pump after breastfeeding to achieve COMPLETE emptying of affected breast, utilizing massage to areas of concern, may use cold compress to affected area only after breast emptying. May take anti-inflammatories i.e. Ibuprofen, Motrin. CALL your OB for assessment and continued treatment with Antibiotics to adequately treat mastitis.  Infant Care: Over the first 2 weeks it is important to keep record of infant's feeding routine (feeding times and durations), wet and dirty diaper frequency, stool color and any spit ups that may occur.  Keep in mind, ALL babies will lose some weight initially (usually no more than 10% by day 3). Until infant returns to/ surpasses birth weight (which can take up to 2 weeks), it is important to offer feedings AT LEAST EVERY 3 HOURS. Remember, if you choose to supplement infant with formula or previously pumped milk, you should always pump in replacement of that feeding in order to promote and maintain a healthy milk supply!  Maternal Care: REST, sleep when the infant sleeps, stay hydrated (water is optimal) drink to thirst, increase caloric intake - breastfeeding mother's need an ADDITIONAL 500 calories per day , eat 3 meals/day as well as snacks in between, limit CAFFIENE intake to 2 cups/day.  Ask your significant other, family members or friends for help when needed, taking advantage of meal trains, allowing others to help with laundry, house chores, etc can help you focus on what is most important early on after delivery… you and your infant, and breastfeeding!   Medications to CONTINUE: Prenatal Vitamins are important to continue taking while breastfeeding. Fish oil, magnesium/calcium supplements often are helpful to support Mothers and their milk supply as well. Tylenol, Ibuprofen, regular Zyrtec, Claritin are SAFE if you suffer from seasonal allergies. Flonase is safe and often an effective medication to take if suffering from sinus drainage/pressure.  Medications to AVOID: Benadryl, Sudafed, any medications including “DM” or have a drying effect to sinus drainage will also dry a mother's milk up. Birth control- your OB will want to address birth control options with you usually around 4-6 weeks postpartum, be sure to notify your MD if you continue to breastfeed as some birth controls may significantly decrease your milk supply. Herbals- some herbs may also decrease your milk supply: PEPPERMINT, MENTHOL in any form (candies, essential oils, teas, etc), so check labels and avoid using in excess.  Pumping: Although we encourage you to focus on breastfeeding over the first 2-4 weeks, you will need to plan to begin pumping. We do recommend implementing pumping by the time infant is 4 weeks old. Pump 2-3 times per day immediately AFTER breastfeeding, it is normal to collect very small amounts initially, but the more consistently you pump, the more you will begin to collect. Store collected milk in refrigerator or freezer. You should also begin offering infant a bottle around 4 weeks. Remember to use slow flow nipples and PACE the bottle-feed. A bottle feed should take about as long as a breastfeeding session.

## 2024-03-05 NOTE — DISCHARGE SUMMARY
Discharge Summary     Monet Mckinnon  : 2000  MRN: [id number]  CSN: [id number]    Date of Admission: 3/2/2024   Date of Discharge:  3/5/2024   Delivering Physician: Salvador Pineda        Admission Diagnosis: Full-term premature rupture of membranes [O42.92]   Discharge Diagnosis: Pregnancy at 38w0d - delivered       Procedures: 3/3/2024  - Vaginal, Spontaneous       Hospital Course  Patient is a 23 y.o.  who at 38w0d had a vaginal birth.  Her postpartum course was without complications.  On PPD #2 she was ready for discharge.  She had normal lochia and pain well controlled with oral medications.    Infant  male  fetus weighing 3250 g (7 lb 2.6 oz)   Apgars -  8 @ 1 minute /  9 @ 5 minutes.    Discharge labs  Lab Results   Component Value Date    WBC 10.73 2024    HGB 11.5 (L) 2024    HCT 34.8 2024     2024       Discharge Medications     Discharge Medications        Continue These Medications        Instructions Start Date   busPIRone 5 MG tablet  Commonly known as: BUSPAR   5 mg, Oral, 3 Times Daily PRN      PRENATAL 1 PO   Oral      triamcinolone 0.5 % cream  Commonly known as: KENALOG   1 application , Topical, 2 Times Daily               Discharge Disposition Home or Self Care   Condition on Discharge: good   Follow-up: 6 weeks with George Chan MD  3/5/2024

## 2024-03-05 NOTE — PROGRESS NOTES
"[facility]  [facility]    Subjective   Postpartum Day 2: Vaginal Delivery    The patient feels well.  Her pain is well controlled with ibuprofen (OTC).   She is ambulating well.  Patient describes her bleeding as thin lochia.    Breastfeeding: infant latching without difficulty.    Objective     Vital Signs Range for the last 24 hours  Temperature: Temp:  [97.6 °F (36.4 °C)-98.3 °F (36.8 °C)] 98.3 °F (36.8 °C)   Temp Source: Temp src: Temporal   BP: BP: (106-108)/(60-63) 106/60   Pulse: Heart Rate:  [73-83] 73   Respirations: Resp:  [18] 18   SPO2: SpO2:  [97 %-98 %] 98 %   O2 Amount (l/min):     O2 Devices Device (Oxygen Therapy): room air   Weight:       Admit Height:  Height: 175.3 cm (69\")      Physical Exam:  General:  no acute distresss.  Lungs:  breathing is unlabored  Abdomen: Fundus: appropriate, firm, non tender  Extremities: normal, atraumatic, no cyanosis, and Negative edema.       Lab results reviewed:  Yes  Rubella:  No results found for: \"RUBELLAIGGIN\" Nurse Transcribed from prenatal record --  No components found for: \"EXTRUBELQUAL\"  Rh Status:    RH type   Date Value Ref Range Status   03/02/2024 Positive  Final     Immunizations:   Immunization History   Administered Date(s) Administered    COVID-19 (MODERNA) 1st,2nd,3rd Dose Monovalent 05/10/2022, 06/07/2022    DTaP / Hep B / IPV 08/11/2005    DTaP, Unspecified 03/27/2003, 06/09/2003, 07/06/2006    Fluzone (or Fluarix & Flulaval for VFC) >6mos 10/20/2023    HPV Quadrivalent 08/04/2014, 07/08/2015    Hep A, 2 Dose 01/02/2018, 08/03/2018    Hep B / HiB 03/27/2003    Hep B, Unspecified 2002, 03/27/2003, 08/11/2005    Hib (PRP-OMP) 06/09/2003, 08/11/2005    IPV 03/27/2003, 06/09/2003, 01/30/2007    Influenza Injectable Mdck Pf Quad 10/13/2022    MCV4 Unspecified 07/08/2015    MMR 08/11/2005, 01/30/2007    Meningococcal MCV4P (Menactra) 08/01/2019    PPD Test 05/16/2022, 06/08/2022    Tdap 08/04/2014    Varicella 08/11/2005, " 01/30/2007     Lab Results (last 24 hours)       ** No results found for the last 24 hours. **            Assessment & Plan       Normal labor and delivery    Full-term premature rupture of membranes      Lisset Mckinnon is Day 2  post-partum  Vaginal, Spontaneous   .      Plan:  Discharge home with standard precautions and return to clinic in 4-6 weeks.      Anushka Chan MD  3/5/2024  08:51 CST

## 2024-03-05 NOTE — PLAN OF CARE
Goal Outcome Evaluation:         Vss, voiding, scant lochia rubra, up ad sanam, breastfeeding and pumping, bonding, ready for discharge.

## 2024-03-05 NOTE — PLAN OF CARE
Goal Outcome Evaluation:  Plan of Care Reviewed With: patient, significant other        Progress: improving  Outcome Evaluation: VSS. FFMLU1 scant lochia. 1st degree lac w/ repair, using comfort products. Pain controlled w/ PRN motrin and tylenol. Breastfeeding and pumping, doing well. Is stating breast fullness and aching, pumping for comfort after breastfeeding. Milk in fridge. Bonding well w/ infant. Showered this shift. planning to d/c home today.

## 2024-03-05 NOTE — OUTREACH NOTE
[facility]  IP Postpartum    Questions/Answers      Flowsheet Row Responses   Best Method for Contacting Cell   Support Person Present Yes   Does the patient have a car seat at the hospital Yes   Delivery Note Reviewed Reviewed   Were birth expectations met? Yes   Is there a need for additional support/resources? No   Lactation Note Reviewed Reviewed   Is additional support needed? No   Any questions or concerns? No   Is the patient going to use Meds to Beds? Yes   Any concerns related discharge meds/ability to  prescriptions? No   OB Discharge Navigator Reviewed  Reviewed   Confirm Postpartum OB appointment Yes   Confirm initial well-child Pediatrician appointment date/time: Yes   Does patient have transportation to appointments? Yes   Any other assistance needed to ensure she is able to attend appointments? No   Does patient have supplies needed at home for  care? Breast Pump, Clothing, Crib, Diapers, Formula            Adali Lopes RN  Maternity Nurse Navigator    3/5/2024, 12:27 CST

## 2024-03-14 ENCOUNTER — PATIENT OUTREACH (OUTPATIENT)
Dept: LABOR AND DELIVERY | Facility: HOSPITAL | Age: 24
End: 2024-03-14
Payer: COMMERCIAL

## 2024-03-14 NOTE — OUTREACH NOTE
[facility]  Postpartum Check-In    Questions/Answers      Flowsheet Row Responses   Visit Setting Telephone   Best Method for Contacting Cell   OB Discharge Note Reviewed  Reviewed   OB Discharge Navigator Reviewed  Reviewed   OB Discharge Medications Reviewed  Reviewed    discharged home with mother? Yes   Current Pain Levels 0-10 0   At Rest Pain Levels 0-10 0   Pain level with activity 0-10 0   Acceptable Pain Level 0-10 3   Verbalized Emotional State Acceptance   Family/Support Network Family   Level of Involvement in Care Interactive, Supportive   Do you feel comfortable in your relationship with your baby? Yes   Have members of your household adjusted to your baby? Yes   Is the baby's father supportive and/or involved with the baby? Yes   How does your partner feel about the baby? Happy, Involved   Do you feel safe at home, school and work? Yes   Are you in a relationship with someone who threatens you or hurts you? No   Do you have the resources to keep yourself and your baby healthy and safe? Yes   Lochia (per patient report) None noted   Amount Scant   Number of pads per day 3   Lochia Odor None   Is patient breastfeeding? Yes, pumping   Postpartum Depression Screening Education Education Provided   Doctor Appointments: Education Provided   Postpartum Care Education Education Provided   S & S to report Education Provided   Followup Appointments Made Yes   Well Child Visit Appointments Made Yes   Appointment Date 24   Provider/Agency [facility]   Well Child Checkup Provider Name Quang   Well Child Check Up Date: 24   Umbilical Cord No reported signs or symptoms   Was the baby circumcised? Yes   Circumcision care and signs/symptoms to report Reviewed   Feeding Readiness Cues: Cooing, Crying, Energy for feeding, Eager, Finger Sucking   Infant Feeding Method Expressed Breast Milk   Expressed milk PO (mL) 2-3oz   Expressed milk- frequency of feedings every 2-4 hours   Number of wet  diapers x 24 hours 10   Last BM x 24 hours 4   Emesis (Unmeasured Occurence) scant   What safe sleep surface is available? Bassinet   Are there stuffed animals, toys, pillows, quilts, blankets, wedges, positioners, bumpers or other loose bedding in the infant's sleeping environment? No   Where does the baby usually sleep? Bassinet   Does the baby ever share a sleep surface with a sibling, adult or pet? No   Does the baby ever share a sleep surface in a bed, couch, recliner or other? No   What position do you place your baby to sleep for naps? Back   What position do you place your baby to sleep at night Back            Review of Systems    Most Recent [place]  Depression Scale Score (EPDS)    Performed by a clinician: 0 (3/14/2024  1:16 PM)    Received via [employer] questionnaire: 0 (3/5/2024)         5 Ps Screen  completed    Client is aware that the RN nurse call center will be calling in 1-2 weeks.     Adali Lopes RN  Maternity Nurse Navigator    3/14/2024, 13:20 CDT

## 2024-03-21 ENCOUNTER — PATIENT OUTREACH (OUTPATIENT)
Dept: CALL CENTER | Facility: HOSPITAL | Age: 24
End: 2024-03-21
Payer: COMMERCIAL

## 2024-03-21 NOTE — OUTREACH NOTE
Motherhood Connection Survey      Flowsheet Row Responses   [facility] patient discharged from? [place]   Week 1 attempt successful? Yes   Call start time 1626   Call end time 1631   Baby sex Boy    discharged home with mother? Yes   Baby sex Boy   Delivery type Vaginal   Emotional state Acceptance   Family support Yes   Do you have all necessary resources to care for you and your baby?  Yes   Have members of your household adjusted to your baby? Yes   Lochia amount Light   Lochia per patient report Rubra   Did you have an episiotomy/tear/abdominal incision? Yes   Feeding Method Combination   Frequency takes MBM in bottle   Pumping Yes   Supplementing Breast Milk   Frequency q 3hrs   Amount 3 oz   Nursing Interventions Lactation education provided   Number of wet diapers x 24 hours 8-10   Last BM x 24 hours 8   Umbilical Cord No reported signs or symptoms   Umbilical cord comments cord off   Was the baby circumcised? Yes   Circumcision care and signs/symptoms to report Reviewed   Circumcision comments healed   Where does the baby usually sleep? Bassinet   Are there stuffed animals, toys, pillows, quilts, blankets, wedges, positioners, bumpers or other loose bedding in the infant's sleeping environment? No   Does the baby ever share a sleep surface in a bed, couch, recliner or other? No   What position do you lay your baby down to sleep? Back   Are you and/or other caregivers smoking inside or outside the baby's home? No   Mom appointment comments: pp f/u scheduled   Baby appointment comments: Peds visit x1   Call completed? Yes   How satisfied were you with the Motherhood Connection Program? 5   Anyone you would like to recognize from your time in the Motherhood Connection Program I really enjoyed having the calls and the check ins              Daniela MCDANIEL - Registered Nurse

## 2024-04-17 ENCOUNTER — POSTPARTUM VISIT (OUTPATIENT)
Dept: OBSTETRICS AND GYNECOLOGY | Age: 24
End: 2024-04-17
Payer: COMMERCIAL

## 2024-04-17 VITALS
HEIGHT: 69 IN | WEIGHT: 147 LBS | DIASTOLIC BLOOD PRESSURE: 78 MMHG | SYSTOLIC BLOOD PRESSURE: 102 MMHG | BODY MASS INDEX: 21.77 KG/M2

## 2024-04-17 NOTE — PROGRESS NOTES
"Subjective   Chief Complaint   Patient presents with    Postpartum Care     Pt here today for 6 week PP. Pt had baby boy named Jamey. Pt is currently breastfeeding. PP depression scale 3. Pt voices no other concerns.      Lisset Mckinnon is a 23 y.o. year old  presenting to be seen for her postpartum visit.  She had a vaginal delivery 6 weeks ago, with [place]   Prenatal course was been benign.  Patient occasionally has a sharp abdominal pain that she thinks might be gas, but is not really bothered by it.  Lochia has stopped and she has already had her first period.    Since delivery she has not been sexually active.  She does not have concerns about post-partum blues/depression.   She is  breastfeeding and reports a good supply .    The following portions of the patient's history were reviewed and updated as appropriate:current medications and allergies    Social History    Tobacco Use      Smoking status: Never      Smokeless tobacco: Never    Review of Systems      Objective   /78   Ht 175.3 cm (69\")   Wt 66.7 kg (147 lb)   LMP 2024 (Approximate)   Breastfeeding Yes   BMI 21.71 kg/m²     General:  well developed; well nourished  no acute distress   Abdomen: Not performed.   Pelvis: Not performed.          Assessment   Normal 6 week postpartum exam  Doing well, no depression     Plan   BC options not reviewed and compared today:  patient does not want to take anything  RTO in 3 months for annual exam    No orders of the defined types were placed in this encounter.         This note was electronically signed.    Anushka Chan MD  2024    Answers submitted by the patient for this visit:  Other (Submitted on 2024)  Please describe your symptoms.: postpartum check up. no complications or illnesses. I only put a time frame because it was a required field.  Have you had these symptoms before?: No  How long have you been having these symptoms?: 1-4 days  Please list any " medications you are currently taking for this condition.: prenatal vitamins.  Please describe any probable cause for these symptoms. : n/a  Primary Reason for Visit (Submitted on 4/11/2024)  What is the primary reason for your visit?: Other

## 2024-07-29 ENCOUNTER — OFFICE VISIT (OUTPATIENT)
Dept: OBSTETRICS AND GYNECOLOGY | Age: 24
End: 2024-07-29
Payer: COMMERCIAL

## 2024-07-29 VITALS
BODY MASS INDEX: 21.98 KG/M2 | HEIGHT: 69 IN | SYSTOLIC BLOOD PRESSURE: 108 MMHG | DIASTOLIC BLOOD PRESSURE: 68 MMHG | WEIGHT: 148.4 LBS

## 2024-07-29 DIAGNOSIS — Z12.4 ENCOUNTER FOR SCREENING FOR CERVICAL CANCER: ICD-10-CM

## 2024-07-29 DIAGNOSIS — N89.8 VAGINAL DRYNESS: ICD-10-CM

## 2024-07-29 DIAGNOSIS — Z01.419 WELL WOMAN EXAM WITH ROUTINE GYNECOLOGICAL EXAM: Primary | ICD-10-CM

## 2024-07-29 PROCEDURE — G0123 SCREEN CERV/VAG THIN LAYER: HCPCS

## 2024-07-29 NOTE — PROGRESS NOTES
"Subjective     Lisset Mckinnon is a 23 y.o. female    History of Present Illness  The patient presents to the office today for her first annual well woman examination. The patient is still breastfeeding and has yet to restart menses. She is sexually active with condoms in use. She would like to discuss previous uterine fibroid found during one of her last obstetric ultrasounds. She denies changes to vaginal discharge, odor, or urinary symptoms.  Gynecologic Exam  The patient's pertinent negatives include no genital itching, genital lesions, genital odor, genital rash, missed menses, pelvic pain, vaginal bleeding or vaginal discharge. The problem has been unchanged. The patient is experiencing no pain. Pertinent negatives include no abdominal pain, anorexia, back pain, chills, constipation, diarrhea, discolored urine, dysuria, fever, flank pain, frequency, headaches, hematuria, joint pain, joint swelling, nausea, painful intercourse, rash, sore throat, urgency or vomiting. Nothing aggravates the symptoms. She has tried nothing for the symptoms. She is sexually active. No, her partner does not have an STD. She uses condoms for contraception. Her menstrual history has been irregular. The maximum temperature recorded prior to her arrival was no fever.         /68   Ht 175.3 cm (69\")   Wt 67.3 kg (148 lb 6.4 oz)   Breastfeeding Yes   BMI 21.91 kg/m²     Outpatient Encounter Medications as of 7/29/2024   Medication Sig Dispense Refill    busPIRone (BUSPAR) 5 MG tablet Take 1 tablet by mouth 3 (Three) Times a Day As Needed (anxiety). 90 tablet 2    Prenatal MV-Min-Fe Fum-FA-DHA (PRENATAL 1 PO) Take  by mouth.       No facility-administered encounter medications on file as of 7/29/2024.       Past Medical History  Past Medical History:   Diagnosis Date    Depression        Surgical History  Past Surgical History:   Procedure Laterality Date    WISDOM TOOTH EXTRACTION         Family History  Family History "   Problem Relation Age of Onset    No Known Problems Father     Thyroid disease Mother     Hypothyroidism Mother     Cancer Brother     Skin cancer Brother     No Known Problems Sister     No Known Problems Sister        The following portions of the patient's history were reviewed and updated as appropriate: allergies, current medications, past family history, past medical history, past social history, past surgical history, and problem list.    Review of Systems   Constitutional: Negative.  Negative for chills and fever.   HENT: Negative.  Negative for sore throat.    Eyes: Negative.    Respiratory: Negative.     Cardiovascular: Negative.    Gastrointestinal: Negative.  Negative for abdominal pain, anorexia, constipation, diarrhea, nausea and vomiting.   Endocrine: Negative.    Genitourinary: Negative.  Negative for dysuria, flank pain, frequency, hematuria, missed menses, pelvic pain, urgency, vaginal bleeding, vaginal discharge and vaginal pain.   Musculoskeletal: Negative.  Negative for back pain and joint pain.   Skin: Negative.  Negative for rash.   Allergic/Immunologic: Negative.    Neurological: Negative.    Hematological: Negative.    Psychiatric/Behavioral: Negative.         Objective   Physical Exam  Vitals and nursing note reviewed. Exam conducted with a chaperone present.   Constitutional:       General: She is not in acute distress.     Appearance: She is well-developed. She is not diaphoretic.   HENT:      Head: Normocephalic.      Right Ear: External ear normal.      Left Ear: External ear normal.      Nose: Nose normal.   Eyes:      General: No scleral icterus.        Right eye: No discharge.         Left eye: No discharge.      Conjunctiva/sclera: Conjunctivae normal.      Pupils: Pupils are equal, round, and reactive to light.   Neck:      Thyroid: No thyromegaly.      Vascular: No carotid bruit.      Trachea: No tracheal deviation.   Cardiovascular:      Rate and Rhythm: Normal rate and regular  rhythm.      Pulses: Normal pulses.      Heart sounds: Normal heart sounds. No murmur heard.  Pulmonary:      Effort: Pulmonary effort is normal. No respiratory distress.      Breath sounds: Normal breath sounds. No wheezing.   Chest:   Breasts:     Breasts are symmetrical.      Right: Normal. No swelling, bleeding, inverted nipple, mass, nipple discharge, skin change or tenderness.      Left: Normal. No swelling, bleeding, inverted nipple, mass, nipple discharge, skin change or tenderness.   Abdominal:      General: Bowel sounds are normal. There is no distension.      Palpations: Abdomen is soft. There is no mass.      Tenderness: There is no abdominal tenderness. There is no right CVA tenderness, left CVA tenderness or guarding.      Hernia: No hernia is present. There is no hernia in the left inguinal area or right inguinal area.   Genitourinary:     General: Normal vulva.      Exam position: Lithotomy position.      Labia:         Right: No rash, tenderness, lesion or injury.         Left: No rash, tenderness, lesion or injury.       Vagina: Normal. No signs of injury and foreign body. No vaginal discharge, erythema, tenderness or bleeding.      Cervix: Normal.      Uterus: Normal. Not enlarged, not fixed and not tender.       Adnexa: Right adnexa normal and left adnexa normal.        Right: No mass, tenderness or fullness.          Left: No mass, tenderness or fullness.        Rectum: Normal. No mass.      Comments: BSU normal  Urethral meatus  Normal  Perineum  Normal  Musculoskeletal:         General: No tenderness. Normal range of motion.      Cervical back: Normal range of motion and neck supple.   Lymphadenopathy:      Head:      Right side of head: No submental, submandibular, tonsillar, preauricular, posterior auricular or occipital adenopathy.      Left side of head: No submental, submandibular, tonsillar, preauricular, posterior auricular or occipital adenopathy.      Cervical: No cervical adenopathy.       Right cervical: No superficial, deep or posterior cervical adenopathy.     Left cervical: No superficial, deep or posterior cervical adenopathy.      Upper Body:      Right upper body: No supraclavicular, axillary or pectoral adenopathy.      Left upper body: No supraclavicular, axillary or pectoral adenopathy.      Lower Body: No right inguinal adenopathy. No left inguinal adenopathy.   Skin:     General: Skin is warm and dry.      Findings: No bruising, erythema or rash.   Neurological:      Mental Status: She is alert and oriented to person, place, and time.      Coordination: Coordination normal.   Psychiatric:         Mood and Affect: Mood normal.         Behavior: Behavior normal.         Thought Content: Thought content normal.         Judgment: Judgment normal.       PHQ-9 Depression Screening  Little interest or pleasure in doing things? 0-->not at all   Feeling down, depressed, or hopeless? 0-->not at all   Trouble falling or staying asleep, or sleeping too much?     Feeling tired or having little energy?     Poor appetite or overeating?     Feeling bad about yourself - or that you are a failure or have let yourself or your family down?     Trouble concentrating on things, such as reading the newspaper or watching television?     Moving or speaking so slowly that other people could have noticed? Or the opposite - being so fidgety or restless that you have been moving around a lot more than usual?     Thoughts that you would be better off dead, or of hurting yourself in some way?     PHQ-9 Total Score 0   If you checked off any problems, how difficult have these problems made it for you to do your work, take care of things at home, or get along with other people?          Assessment & Plan   Diagnoses and all orders for this visit:    1. Well woman exam with routine gynecological exam (Primary)  Comments:  The patient presents to the office today for her first annual well woman examination. The patient  does not have a pcp and screening lab work will be completed today per her request. She has yet to start her menstrual cycle after recent delivery as she is breastfeeding. She reports menstrual cycles were irregular with heavy bleeding prior to pregnancy.  Orders:  -     CBC & Differential  -     Comprehensive Metabolic Panel  -     Lipid Panel With LDL / HDL Ratio  -     TSH  -     T3, Uptake  -     Vitamin D,25-Hydroxy  -     T4, Free  -     Hemoglobin A1c  -     Hepatitis C Antibody    2. Encounter for screening for cervical cancer  Comments:  The patient is sexually active and declines std screening. Pelvic examination does reveal vaginal dryness. Pap smear completed.  Orders:  -     Liquid-based Pap Smear, Screening    3. Vaginal dryness  Comments:  Pelvic examination does reveal vaginal dryness. She wishes to try use of coconut oil first before trying hormone vaginal cream.          Normal GYN exam. Encouraged SBE, pt is aware how to do self breast exam and the importance of same. Discussed weight management and importance of maintaining a healthy weight. Discussed Vitamin D intake and the importance of adequate vitamin D for both bone health and a healthy immune system.  Discussed daily exercise and the importance of same, in regards to a healthy heart as well as helping to maintain her weight and improving her mental health. Pap smear is done per ASCCP guidelines. HPV is not done. Lab work up is up to date.      BMI is within normal parameters. No other follow-up for BMI required.      Shira Bonds, APRN  7/29/2024

## 2024-07-30 LAB
25(OH)D3+25(OH)D2 SERPL-MCNC: 30.2 NG/ML (ref 30–100)
ALBUMIN SERPL-MCNC: 4.6 G/DL (ref 3.5–5.2)
ALBUMIN/GLOB SERPL: 1.7 G/DL
ALP SERPL-CCNC: 92 U/L (ref 39–117)
ALT SERPL-CCNC: 11 U/L (ref 1–33)
AST SERPL-CCNC: 14 U/L (ref 1–32)
BASOPHILS # BLD AUTO: 0.05 10*3/MM3 (ref 0–0.2)
BASOPHILS NFR BLD AUTO: 0.7 % (ref 0–1.5)
BILIRUB SERPL-MCNC: 0.5 MG/DL (ref 0–1.2)
BUN SERPL-MCNC: 14 MG/DL (ref 6–20)
BUN/CREAT SERPL: 25.5 (ref 7–25)
CALCIUM SERPL-MCNC: 9.4 MG/DL (ref 8.6–10.5)
CHLORIDE SERPL-SCNC: 105 MMOL/L (ref 98–107)
CHOLEST SERPL-MCNC: 164 MG/DL (ref 0–200)
CO2 SERPL-SCNC: 27 MMOL/L (ref 22–29)
CREAT SERPL-MCNC: 0.55 MG/DL (ref 0.57–1)
EGFRCR SERPLBLD CKD-EPI 2021: 133.9 ML/MIN/1.73
EOSINOPHIL # BLD AUTO: 0.14 10*3/MM3 (ref 0–0.4)
EOSINOPHIL NFR BLD AUTO: 1.9 % (ref 0.3–6.2)
ERYTHROCYTE [DISTWIDTH] IN BLOOD BY AUTOMATED COUNT: 11.9 % (ref 12.3–15.4)
GEN CATEG CVX/VAG CYTO-IMP: NORMAL
GLOBULIN SER CALC-MCNC: 2.7 GM/DL
GLUCOSE SERPL-MCNC: 90 MG/DL (ref 65–99)
HBA1C MFR BLD: 5 % (ref 4.8–5.6)
HCT VFR BLD AUTO: 44 % (ref 34–46.6)
HCV IGG SERPL QL IA: NON REACTIVE
HDLC SERPL-MCNC: 61 MG/DL (ref 40–60)
HGB BLD-MCNC: 13.9 G/DL (ref 12–15.9)
IMM GRANULOCYTES # BLD AUTO: 0.02 10*3/MM3 (ref 0–0.05)
IMM GRANULOCYTES NFR BLD AUTO: 0.3 % (ref 0–0.5)
LAB AP CASE REPORT: NORMAL
LAB AP GYN ADDITIONAL INFORMATION: NORMAL
LDLC SERPL CALC-MCNC: 90 MG/DL (ref 0–100)
LDLC/HDLC SERPL: 1.47 {RATIO}
LYMPHOCYTES # BLD AUTO: 1.59 10*3/MM3 (ref 0.7–3.1)
LYMPHOCYTES NFR BLD AUTO: 21.8 % (ref 19.6–45.3)
Lab: NORMAL
MCH RBC QN AUTO: 29.4 PG (ref 26.6–33)
MCHC RBC AUTO-ENTMCNC: 31.6 G/DL (ref 31.5–35.7)
MCV RBC AUTO: 93 FL (ref 79–97)
MONOCYTES # BLD AUTO: 0.52 10*3/MM3 (ref 0.1–0.9)
MONOCYTES NFR BLD AUTO: 7.1 % (ref 5–12)
NEUTROPHILS # BLD AUTO: 4.98 10*3/MM3 (ref 1.7–7)
NEUTROPHILS NFR BLD AUTO: 68.2 % (ref 42.7–76)
NRBC BLD AUTO-RTO: 0 /100 WBC (ref 0–0.2)
PATH INTERP SPEC-IMP: NORMAL
PLATELET # BLD AUTO: 247 10*3/MM3 (ref 140–450)
POTASSIUM SERPL-SCNC: 4.2 MMOL/L (ref 3.5–5.2)
PROT SERPL-MCNC: 7.3 G/DL (ref 6–8.5)
RBC # BLD AUTO: 4.73 10*6/MM3 (ref 3.77–5.28)
SODIUM SERPL-SCNC: 142 MMOL/L (ref 136–145)
STAT OF ADQ CVX/VAG CYTO-IMP: NORMAL
T3RU NFR SERPL: 28 % (ref 24–39)
T4 FREE SERPL-MCNC: 1.08 NG/DL (ref 0.93–1.7)
TRIGL SERPL-MCNC: 66 MG/DL (ref 0–150)
TSH SERPL DL<=0.005 MIU/L-ACNC: 1.42 UIU/ML (ref 0.27–4.2)
VLDLC SERPL CALC-MCNC: 13 MG/DL (ref 5–40)
WBC # BLD AUTO: 7.3 10*3/MM3 (ref 3.4–10.8)

## 2024-07-31 DIAGNOSIS — E55.9 VITAMIN D DEFICIENCY: Primary | ICD-10-CM

## 2024-07-31 RX ORDER — ERGOCALCIFEROL 1.25 MG/1
50000 CAPSULE ORAL WEEKLY
Qty: 30 CAPSULE | Refills: 1 | Status: SHIPPED | OUTPATIENT
Start: 2024-07-31

## 2024-08-28 ENCOUNTER — OFFICE VISIT (OUTPATIENT)
Dept: OBSTETRICS AND GYNECOLOGY | Age: 24
End: 2024-08-28
Payer: COMMERCIAL

## 2024-08-28 VITALS
HEIGHT: 69 IN | RESPIRATION RATE: 18 BRPM | SYSTOLIC BLOOD PRESSURE: 102 MMHG | DIASTOLIC BLOOD PRESSURE: 62 MMHG | BODY MASS INDEX: 21.48 KG/M2 | WEIGHT: 145 LBS

## 2024-08-28 DIAGNOSIS — Z86.018 HISTORY OF UTERINE FIBROID: Primary | ICD-10-CM

## 2024-08-28 PROCEDURE — 99213 OFFICE O/P EST LOW 20 MIN: CPT

## 2024-08-28 NOTE — PROGRESS NOTES
"Chief Complaint   Patient presents with    Follow-up     Patient presents today for fibroid follow up that was found on her February US, US today.        History:  Lisset Mckinnon is a 23 y.o. female who presents today for follow-up for evaluation of the above:  The patient presents to the office today to follow up on a previously noted uterine fibroid that was visualized with imaging in February.        ROS:  Review of Systems   Constitutional: Negative.    HENT: Negative.     Eyes: Negative.    Respiratory: Negative.     Cardiovascular: Negative.    Gastrointestinal: Negative.    Endocrine: Negative.    Genitourinary: Negative.    Musculoskeletal: Negative.    Skin: Negative.    Allergic/Immunologic: Negative.    Neurological: Negative.    Hematological: Negative.    Psychiatric/Behavioral: Negative.         Ms. Mckinnon  reports that she has never smoked. She has never used smokeless tobacco. She reports that she does not currently use alcohol. She reports that she does not use drugs.      Current Outpatient Medications:     busPIRone (BUSPAR) 5 MG tablet, Take 1 tablet by mouth 3 (Three) Times a Day As Needed (anxiety)., Disp: 90 tablet, Rfl: 2    Prenatal MV-Min-Fe Fum-FA-DHA (PRENATAL 1 PO), Take  by mouth., Disp: , Rfl:     vitamin D (ERGOCALCIFEROL) 1.25 MG (05553 UT) capsule capsule, Take 1 capsule by mouth 1 (One) Time Per Week., Disp: 30 capsule, Rfl: 1      OBJECTIVE:  /62   Resp 18   Ht 175.3 cm (69\")   Wt 65.8 kg (145 lb)   LMP 08/05/2024   Breastfeeding Yes   BMI 21.41 kg/m²    Physical Exam  Constitutional:       General: She is not in acute distress.     Appearance: Normal appearance. She is not ill-appearing or diaphoretic.   HENT:      Head: Normocephalic and atraumatic.   Pulmonary:      Effort: Pulmonary effort is normal. No respiratory distress.   Musculoskeletal:         General: No deformity.      Cervical back: Normal range of motion.   Skin:     Coloration: Skin is not pale. "   Neurological:      General: No focal deficit present.      Mental Status: She is alert.   Psychiatric:         Mood and Affect: Mood normal.         Behavior: Behavior normal.         Thought Content: Thought content normal.         Judgment: Judgment normal.       Assessment/Plan    Diagnoses and all orders for this visit:    1. History of uterine fibroid (Primary)  Comments:  Patient presents to the office today for follow up on a previously noted uterine fibroid that was seen on her 20 week anatomy ultrasound that measured 1 cm. Ultrasound was completed today that shows previously noted fibroid is no longer there. Ovaries are normal. Patient will follow up as needed and has her WWE scheduled for next year.          An After Visit Summary was printed and given to the patient at discharge.  Return if symptoms worsen or fail to improve, for Next scheduled follow up. Sooner if problems arise.          Shira Bonds, APRN

## 2024-09-16 ENCOUNTER — OFFICE VISIT (OUTPATIENT)
Dept: OBSTETRICS AND GYNECOLOGY | Age: 24
End: 2024-09-16
Payer: COMMERCIAL

## 2024-09-16 VITALS
BODY MASS INDEX: 22.22 KG/M2 | WEIGHT: 150 LBS | SYSTOLIC BLOOD PRESSURE: 108 MMHG | DIASTOLIC BLOOD PRESSURE: 62 MMHG | HEIGHT: 69 IN

## 2024-09-16 DIAGNOSIS — R35.0 URINARY FREQUENCY: Primary | ICD-10-CM

## 2024-09-16 DIAGNOSIS — N92.6 ABNORMAL MENSES: ICD-10-CM

## 2024-09-16 DIAGNOSIS — R30.0 BURNING WITH URINATION: ICD-10-CM

## 2024-09-16 LAB
B-HCG UR QL: NEGATIVE
BILIRUB BLD-MCNC: NEGATIVE MG/DL
CLARITY, POC: CLEAR
COLOR UR: ABNORMAL
EXPIRATION DATE: NORMAL
GLUCOSE UR STRIP-MCNC: NEGATIVE MG/DL
INTERNAL NEGATIVE CONTROL: NEGATIVE
INTERNAL POSITIVE CONTROL: POSITIVE
KETONES UR QL: NEGATIVE
LEUKOCYTE EST, POC: NEGATIVE
Lab: NORMAL
NITRITE UR-MCNC: POSITIVE MG/ML
PH UR: 5 [PH] (ref 5–8)
PROT UR STRIP-MCNC: ABNORMAL MG/DL
RBC # UR STRIP: ABNORMAL /UL
SP GR UR: 1.02 (ref 1–1.03)
UROBILINOGEN UR QL: NORMAL

## 2024-09-16 PROCEDURE — 81002 URINALYSIS NONAUTO W/O SCOPE: CPT | Performed by: NURSE PRACTITIONER

## 2024-09-16 PROCEDURE — 99214 OFFICE O/P EST MOD 30 MIN: CPT | Performed by: NURSE PRACTITIONER

## 2024-09-16 PROCEDURE — 81025 URINE PREGNANCY TEST: CPT | Performed by: NURSE PRACTITIONER

## 2024-09-16 RX ORDER — NITROFURANTOIN 25; 75 MG/1; MG/1
100 CAPSULE ORAL 2 TIMES DAILY
Qty: 10 CAPSULE | Refills: 0 | Status: SHIPPED | OUTPATIENT
Start: 2024-09-16 | End: 2024-09-21

## 2024-09-18 LAB
BACTERIA UR CULT: ABNORMAL
BACTERIA UR CULT: ABNORMAL
OTHER ANTIBIOTIC SUSC ISLT: ABNORMAL

## 2024-10-03 NOTE — ANESTHESIA PRE PROCEDURE
Department of Anesthesiology  Preprocedure Note       Name:  Zamzam Byrd   Age:  27 y.o.  :  2000                                          MRN:  [id number]         Date:  2021      Surgeon: * No surgeons listed *    Procedure: * No procedures listed *    Medications prior to admission:   Prior to Admission medications    Medication Sig Start Date End Date Taking? Authorizing Provider   Prenatal Vit-Fe Fumarate-FA (PRENATAL VITAMIN) 27-0.8 MG TABS Take 1 tablet by mouth daily 20  Yes Lucien Ortega, APRN - CNP   Misc.  Devices (BREAST PUMP) MISC Use as directed 21   Je Mejia MD       Current medications:    Current Facility-Administered Medications   Medication Dose Route Frequency Provider Last Rate Last Admin    ondansetron (ZOFRAN-ODT) disintegrating tablet 4 mg  4 mg Oral Q8H PRN Lavaun Passey, APRN - CNM        Or    ondansetron (ZOFRAN) injection 4 mg  4 mg Intravenous Q6H PRN Lavaun Passey, APRN - CNM        acetaminophen (TYLENOL) tablet 650 mg  650 mg Oral Q4H PRN Lavaun Passey, APRN - CNM        lactated ringers infusion   Intravenous Continuous Lavaun Passey, APRN -  mL/hr at 21 New Bag at 21    lactated ringers bolus  500 mL Intravenous PRN Lavaun Passey, APRN - CNM        Or    lactated ringers bolus  1,000 mL Intravenous PRN Lavaun Passey, APRN - CNM        sodium chloride flush 0.9 % injection 5-40 mL  5-40 mL Intravenous 2 times per day Lavaun Passey, APRN - CNM        sodium chloride flush 0.9 % injection 5-40 mL  5-40 mL Intravenous PRN Lavaun Passey, APRN - CNM        0.9 % sodium chloride infusion  25 mL Intravenous PRN Lavaun Passey, APRN - CNM        acetaminophen (TYLENOL) tablet 650 mg  650 mg Oral Q4H PRN Lavaun Passey, APRN - CNM        benzocaine-menthol (DERMOPLAST) 20-0.5 % spray   Topical PRN Lavaun Passey, APRN - CNM        docusate sodium (COLACE) capsule 100 mg  100 mg Oral BID Jaylen [place] Bradly Barbour APRN - CNM        butorphanol (STADOL) injection 1 mg  1 mg Intravenous Q3H PRN Royal Corrales APRN - CNM   1 mg at 06/05/21 2043    oxytocin (PITOCIN) 30 units in 500 mL infusion  1-20 jan-units/min Intravenous Continuous Royal Badger, APRN - CNM        oxytocin (PITOCIN) 10 unit bolus from the bag  10 Units Intravenous PRN Royal Badger, APRN - CNM        And    oxytocin (PITOCIN) 30 units in 500 mL infusion  87.3 jan-units/min Intravenous Continuous PRN Royal Badger, APRN - CNM        [START ON 6/6/2021] penicillin G potassium in d5w IVPB 2.5 Million Units  2.5 Million Units Intravenous Q4H Royal Corrales APRN - CNM           Allergies: Allergies   Allergen Reactions    Latex Rash    Dust Mite Extract     Mouse Epithelium Allergy Skin Test Hives    Rabbit Epithelium     Dog Epithelium Rash       Problem List:    Patient Active Problem List   Diagnosis Code    Cramping affecting pregnancy, antepartum O26.899, R10.9    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 No pertinent surgical history. Social History:    Social History     Tobacco Use    Smoking status: Never Smoker    Smokeless tobacco: Never Used   Substance Use Topics    Alcohol use:  No                                Counseling given: Not Answered      Vital Signs (Current):   Vitals:    06/05/21 1957   Pulse: 79   Resp: 18   Temp: 99 °F (37.2 °C)   TempSrc: Temporal                                              BP Readings from Last 3 Encounters:   06/01/21 113/74   05/28/21 116/69   05/25/21 100/70       NPO Status:                                                                                 BMI:   Wt Readings from Last 3 Encounters:   06/01/21 155 lb (70.3 kg) (87 %, Z= 1.11)*   05/27/21 154 lb (69.9 kg) (86 %, Z= 1.08)*   05/25/21 154 lb (69.9 kg) (86 %, Z= 1.08)*     * Growth percentiles are based on CDC (Girls, 2-20 Years) data. There is no height or weight on file to calculate BMI.    CBC:   Lab Results   Component Value Date    WBC 16.0 06/05/2021    RBC 4.29 06/05/2021    HGB 12.9 06/05/2021    HCT 39.2 06/05/2021    MCV 91.4 06/05/2021    RDW 13.8 06/05/2021     06/05/2021       CMP:   Lab Results   Component Value Date     02/05/2019    K 3.9 02/05/2019     02/05/2019    CO2 24 02/05/2019    BUN 12 02/05/2019    CREATININE 0.5 02/05/2019    LABGLOM >60 02/05/2019    GLUCOSE 92 02/05/2019    PROT 7.9 02/05/2019    CALCIUM 9.4 02/05/2019    BILITOT 0.3 02/05/2019    ALKPHOS 65 02/05/2019    AST 12 02/05/2019    ALT 22 02/05/2019       POC Tests: No results for input(s): POCGLU, POCNA, POCK, POCCL, POCBUN, POCHEMO, POCHCT in the last 72 hours. Coags: No results found for: PROTIME, INR, APTT    HCG (If Applicable):   Lab Results   Component Value Date    PREGTESTUR positive 10/23/2020        ABGs: No results found for: PHART, PO2ART, YBE3ANJ, UEW2IBS, BEART, T1CDDEBB     Type & Screen (If Applicable):  No results found for: LABABO, LABRH    Drug/Infectious Status (If Applicable):  No results found for: HIV, HEPCAB    COVID-19 Screening (If Applicable): No results found for: COVID19        Anesthesia Evaluation  Patient summary reviewed and Nursing notes reviewed  Airway:         Dental:          Pulmonary:Negative Pulmonary ROS                              Cardiovascular:Negative CV ROS             Beta Blocker:  Not on Beta Blocker         Neuro/Psych:   Negative Neuro/Psych ROS              GI/Hepatic/Renal: Neg GI/Hepatic/Renal ROS            Endo/Other: Negative Endo/Other ROS             Pt had no PAT visit       Abdominal:           Vascular: negative vascular ROS.                                        Anesthesia Plan        FILIBERTO Isbell - CRNA   6/5/2021 [FreeTextEntry1] : Ms. WHELAN is a 74 year female with pmhx of T2D, HTN, HLD, subclinical hyperthyroidism, thyroid nodules, anemia who presents for follow-up.  Last visit, 12/14/2023 with myself  Interval change: Since last visit, has continued methimazole regimen, as below Endorses hot flashes intermittently, increased tiredness Repeat thyroid US not performed since last visit, will schedule Labs from May 2024 with TSH at goal (1.67), as well as TT3 (130) Following ophthal for cataract Continues current methimazole regimen, tolerating this regimen well No positional dyspnea, occasionally SOB with exertion, denies dysphagia  Current regimen: Methimazole 2.5mg/2.5mg/5mg alternating dose  1. Hyperthyroidism Hospitalized in 9/2022 for abdominal pain, per patient informed to FU with endocrine after this visit With HIE review, endocrine follow-up outpatient recommendation occurred from 11/2022 hospital visit At time of 11/2022 hospitalization: + hair loss, + weight loss, starting ~summer 2022 Endorses history, many years ago (?30) of partial thyroidectomy.  Per patient FNA prior to this, no cancer. ?hyperthyroidism at that time Since that time, thyroid function was normal until fall 2022 TSH suppressed on labs since 11/15/2022 with normal FT4, as low as 0.01 Started on methimazole in 11/2022 +MNG on thyroid US inpatient 11/2022, multiple nodules meet FNA criteria S/p Thyroid uptake scan in April 2023 c/w toxic MNG (hot nodule was not c/w nodules that met criteria for FNA) S/p FNA in May 2023 with Dr Reyes of left mid nodule (2 x 1.4 x 1.5cm) and isthmus nodule of 1.8 x 2.3 x 2.3cm, [place] (benign)  2. T2D Managed by PCP A1C 6.5% (9/10/24) from 6.6% (5/9/24) from 5.7% (2/14/23) from 7% (5/12/2022) Current regimen: None Past regimen: Metformin 500mg daily, stopped r/t improvement of sugar

## 2024-12-10 ENCOUNTER — OFFICE VISIT (OUTPATIENT)
Dept: OBSTETRICS AND GYNECOLOGY | Age: 24
End: 2024-12-10
Payer: COMMERCIAL

## 2024-12-10 VITALS
WEIGHT: 157 LBS | SYSTOLIC BLOOD PRESSURE: 122 MMHG | DIASTOLIC BLOOD PRESSURE: 71 MMHG | BODY MASS INDEX: 23.25 KG/M2 | HEIGHT: 69 IN

## 2024-12-10 DIAGNOSIS — F41.9 ANXIETY: ICD-10-CM

## 2024-12-10 DIAGNOSIS — N91.2 AMENORRHEA: Primary | ICD-10-CM

## 2024-12-10 LAB
B-HCG UR QL: NEGATIVE
EXPIRATION DATE: NORMAL
INTERNAL NEGATIVE CONTROL: NEGATIVE
INTERNAL POSITIVE CONTROL: POSITIVE
Lab: NORMAL

## 2024-12-10 RX ORDER — SERTRALINE HYDROCHLORIDE 25 MG/1
25 TABLET, FILM COATED ORAL DAILY
Qty: 30 TABLET | Refills: 0 | Status: CANCELLED | OUTPATIENT
Start: 2024-12-10 | End: 2025-01-09

## 2024-12-10 NOTE — PROGRESS NOTES
Subjective   Lisset Mckinnon is a 23 y.o. female.     History of Present Illness  The patient presents to the office today for evaluation of ongoing amenorrhea and anxiety. She reports her LMP was 8/5/2024 with recent episode of pink vaginal discharge for one day. UPT at home negative. She has also expressed she feel that the Buspar used for anxiety is not working and wishes to discuss an alternative option for treatment.  Menstrual Problem  Today's concern : Amenorrhea since 8/5/2024 with recent stopping of breastfeeding..   Symptoms are new.   Onset was 1 to 6 months.   Symptoms occur daily.   Symptoms have been unchanged since onset.   Symptoms include fatigue and nausea.    Pertinent negative symptoms include no abdominal pain, no anorexia, no joint pain, no change in stool, no chest pain, no chills, no congestion, no cough, no diaphoresis, no fever, no headaches, no joint swelling, no myalgias, no neck pain, no numbness, no rash, no sore throat, no swollen glands, no dysuria, no vertigo, no visual change, no vomiting and no weakness.   Aggravating factors include nothing.   Treatments tried include nothing.   Improvement on treatment was no relief.            Review of Systems   Constitutional:  Positive for fatigue. Negative for chills, diaphoresis and fever.   HENT: Negative.  Negative for congestion, sore throat and swollen glands.    Eyes: Negative.    Respiratory: Negative.  Negative for cough.    Cardiovascular: Negative.  Negative for chest pain.   Gastrointestinal:  Positive for nausea. Negative for abdominal pain, anorexia and vomiting.   Endocrine: Negative.    Genitourinary:  Positive for menstrual problem. Negative for dysuria.   Musculoskeletal: Negative.  Negative for joint pain, myalgias and neck pain.   Skin: Negative.  Negative for rash.   Allergic/Immunologic: Negative.    Neurological: Negative.  Negative for vertigo, weakness and numbness.   Hematological: Negative.     Psychiatric/Behavioral: Negative.         Objective   Physical Exam  Vitals and nursing note reviewed.   Constitutional:       General: She is not in acute distress.     Appearance: Normal appearance. She is not ill-appearing or diaphoretic.   HENT:      Head: Normocephalic and atraumatic.   Pulmonary:      Effort: Pulmonary effort is normal. No respiratory distress.   Musculoskeletal:         General: No deformity.      Cervical back: Normal range of motion.   Skin:     Coloration: Skin is not pale.   Neurological:      General: No focal deficit present.      Mental Status: She is alert.   Psychiatric:         Mood and Affect: Mood normal.         Behavior: Behavior normal.         Thought Content: Thought content normal.         Judgment: Judgment normal.       Assessment & Plan   Diagnoses and all orders for this visit:    1. Amenorrhea (Primary)  Comments:  Patient presents to the office today for evaluation of ongoing amenorrhea. She recently stopped breastfeeding. Her LMP was noted on 8/5/24 with an additional episode of pink vaginal discharge recently. UPT in office negative today with patient taking at home tests with negative results as well. Patient is sexually active and does report intercourse is not always protected. She states that she had unprotected intercourse during her ovulatory period in September. HCG quant will be completed today. She will be notified of results. Discussed with the patient that likely due to her postpartum status and with cessation of breastfeeding it may take periods up to 6 months to return to normalcy. Encouraged patient to contact office if she does not have a period within the next 3 months.  Orders:  -     HCG, B-subunit, Quantitative ([facility] Only)  -     POC Pregnancy, Urine    2. Anxiety  Comments:  She has tried use of Buspar and does not feel that this is working for her. She feels overwhelmed and anxious most of the time. She has tried use of Zoloft in the past  and reports nightmares. Will try patient on Prozac as she is concerned about weight gain and sexual side effects. She will follow up in 1 month for medication recheck.  Orders:  -     FLUoxetine (PROzac) 20 MG capsule; Take 1 capsule by mouth Daily for 30 days.  Dispense: 30 capsule; Refill: 0       BMI is within normal parameters. No other follow-up for BMI required.       Shira Bonds, APRN

## 2024-12-11 LAB — HCG INTACT+B SERPL-ACNC: <1 MIU/ML

## 2025-01-08 ENCOUNTER — OFFICE VISIT (OUTPATIENT)
Dept: OBSTETRICS AND GYNECOLOGY | Age: 25
End: 2025-01-08
Payer: COMMERCIAL

## 2025-01-08 VITALS
HEIGHT: 69 IN | BODY MASS INDEX: 23.25 KG/M2 | DIASTOLIC BLOOD PRESSURE: 70 MMHG | WEIGHT: 157 LBS | SYSTOLIC BLOOD PRESSURE: 123 MMHG

## 2025-01-08 DIAGNOSIS — F41.9 ANXIETY: Primary | ICD-10-CM

## 2025-01-08 NOTE — PROGRESS NOTES
"Chief Complaint   Patient presents with    Medication Check     Patient is here today for a 4 week GYN follow up regarding medication checks on Prozac. Patient states since starting the medication she feels like her anxiety is better.  Patient denies any other problems or concerns.       History:  Lisset Mckinnon is a 24 y.o. female who presents today for follow-up for evaluation of the above:  The patient presents to the office today for her 4 week follow up after starting use of Prozac for anxiety.         ROS:  Review of Systems   Constitutional: Negative.    HENT: Negative.     Eyes: Negative.    Respiratory: Negative.     Cardiovascular: Negative.    Gastrointestinal: Negative.    Endocrine: Negative.    Genitourinary: Negative.    Musculoskeletal: Negative.    Skin: Negative.    Allergic/Immunologic: Negative.    Neurological: Negative.    Hematological: Negative.    Psychiatric/Behavioral: Negative.  Negative for agitation. The patient is not nervous/anxious.        Ms. Mckinnon  reports that she has never smoked. She has never used smokeless tobacco. She reports current alcohol use. She reports that she does not use drugs.      Current Outpatient Medications:     FLUoxetine (PROzac) 20 MG capsule, Take 1 capsule by mouth Daily for 90 days., Disp: 90 capsule, Rfl: 3      OBJECTIVE:  /70   Ht 175.3 cm (69\")   Wt 71.2 kg (157 lb)   LMP 08/05/2024 (Approximate)   BMI 23.18 kg/m²    Physical Exam  Vitals and nursing note reviewed.   Constitutional:       General: She is not in acute distress.     Appearance: Normal appearance. She is not ill-appearing or diaphoretic.   HENT:      Head: Normocephalic and atraumatic.   Cardiovascular:      Rate and Rhythm: Normal rate and regular rhythm.      Pulses: Normal pulses.      Heart sounds: Normal heart sounds.   Pulmonary:      Effort: Pulmonary effort is normal. No respiratory distress.      Breath sounds: Normal breath sounds.   Musculoskeletal:         " General: No deformity.      Cervical back: Normal range of motion.   Skin:     Coloration: Skin is not pale.   Neurological:      General: No focal deficit present.      Mental Status: She is alert.   Psychiatric:         Mood and Affect: Mood normal. Mood is not anxious.         Behavior: Behavior normal.         Thought Content: Thought content normal.         Judgment: Judgment normal.       Assessment/Plan    Diagnoses and all orders for this visit:    1. Anxiety (Primary)  Comments:  She returns to the office today for her 4 week follow up after starting use of Prozac. Patient reports anxiety has improved quite a bit. Work and home life stressors has improved with use of medication. She wishes to continue. Refills sent to pharmacy. She will keep next scheduled [facility] appointment or return sooner if needed.   Orders:  -     FLUoxetine (PROzac) 20 MG capsule; Take 1 capsule by mouth Daily for 90 days.  Dispense: 90 capsule; Refill: 3          An After Visit Summary was printed and given to the patient at discharge.  Return if symptoms worsen or fail to improve, for Next scheduled follow up. Sooner if problems arise.          Shira Bonds, SURYA

## 2025-03-26 ENCOUNTER — TELEPHONE (OUTPATIENT)
Dept: OBSTETRICS AND GYNECOLOGY | Age: 25
End: 2025-03-26

## 2025-03-26 NOTE — TELEPHONE ENCOUNTER
Hub staff attempted to follow warm transfer process and was unsuccessful     Caller: Lisset Mckinnon    Relationship to patient: Self    Best call back number: [phone]     Patient is needing: PATIENT CALLED INQUIRING ABOUT GETTING HER PROZAC DOSAGE INCREASED STATING HER ANXIETY HAS GOTTEN WORSE LATELY AND FEELS VERY OVERWHELMED.  PATIENT WOULD ALSO LIKE TO SPEAK WITH SOMEONE IN REGARDS TO WHY SHE CONTINUES TO GET UTIs AND WHAT CAN BE DONE TO PREVENT THEM IN THE FUTURE. SHE STATES SHE IS CURRENTLY EXPERIENCING BURNING WITH URINATION, FREQUENT URINATION AND FEELS LIKE SHE IS NOT EMPTYING HER BLADDER.  [facility] [place] [facility] HAS BEEN VERIFIED WITH PATIENT.

## 2025-03-27 ENCOUNTER — OFFICE VISIT (OUTPATIENT)
Dept: OBSTETRICS AND GYNECOLOGY | Age: 25
End: 2025-03-27
Payer: COMMERCIAL

## 2025-03-27 VITALS
BODY MASS INDEX: 23.99 KG/M2 | HEIGHT: 69 IN | WEIGHT: 162 LBS | DIASTOLIC BLOOD PRESSURE: 71 MMHG | SYSTOLIC BLOOD PRESSURE: 118 MMHG

## 2025-03-27 DIAGNOSIS — N89.8 VAGINAL IRRITATION: ICD-10-CM

## 2025-03-27 DIAGNOSIS — N39.3 SUI (STRESS URINARY INCONTINENCE, FEMALE): ICD-10-CM

## 2025-03-27 DIAGNOSIS — F41.9 ANXIETY: Primary | ICD-10-CM

## 2025-03-27 LAB
BILIRUB BLD-MCNC: NEGATIVE MG/DL
CLARITY, POC: CLEAR
COLOR UR: ABNORMAL
GLUCOSE UR STRIP-MCNC: NEGATIVE MG/DL
KETONES UR QL: NEGATIVE
LEUKOCYTE EST, POC: NEGATIVE
NITRITE UR-MCNC: NEGATIVE MG/ML
PH UR: 6 [PH] (ref 5–8)
PROT UR STRIP-MCNC: ABNORMAL MG/DL
RBC # UR STRIP: ABNORMAL /UL
SP GR UR: 1.02 (ref 1–1.03)
UROBILINOGEN UR QL: ABNORMAL

## 2025-03-27 RX ORDER — ONDANSETRON 4 MG/1
TABLET, ORALLY DISINTEGRATING ORAL
COMMUNITY
Start: 2025-03-05

## 2025-03-27 RX ORDER — FLUOXETINE HYDROCHLORIDE 40 MG/1
40 CAPSULE ORAL DAILY
Qty: 90 CAPSULE | Refills: 0 | Status: SHIPPED | OUTPATIENT
Start: 2025-03-27 | End: 2025-06-25

## 2025-03-27 NOTE — PROGRESS NOTES
"Chief Complaint   Patient presents with    Med Management     Patient is here today for a GYN follow regarding wanting to up her prozac, possibly having a UTI and BV. Patient denies any other problems or concerns.        History:  Lisset Mckinnon is a 24 y.o. female who presents today for follow-up for evaluation of the above:  The patient presents to the office today for follow up on anxiety. Patient feels that she is having some symptoms of a vaginal bacterial infection.         ROS:  Review of Systems   Constitutional: Negative.    HENT: Negative.     Eyes: Negative.    Respiratory: Negative.     Cardiovascular: Negative.    Gastrointestinal: Negative.    Endocrine: Negative.    Genitourinary:  Positive for frequency. Negative for vaginal discharge and vaginal pain.        Vaginal irritation, denies discharge.   Musculoskeletal: Negative.    Skin: Negative.    Allergic/Immunologic: Negative.    Neurological: Negative.    Hematological: Negative.    Psychiatric/Behavioral: Negative.         Ms. Mckinnon  reports that she has never smoked. She has never used smokeless tobacco. She reports current alcohol use. She reports that she does not use drugs.      Current Outpatient Medications:     FLUoxetine (PROzac) 40 MG capsule, Take 1 capsule by mouth Daily for 90 days., Disp: 90 capsule, Rfl: 0    ondansetron ODT (ZOFRAN-ODT) 4 MG disintegrating tablet, DISSOLVE ONE TABLET BY MOUTH THREE TIMES A DAY AS NEEDED FOR NAUSEA OR VOMITING FOR 7 DAYS, Disp: , Rfl:       OBJECTIVE:  /71   Ht 175.3 cm (69\")   Wt 73.5 kg (162 lb)   LMP 03/18/2025 (Exact Date)   BMI 23.92 kg/m²    Physical Exam    Assessment/Plan    Diagnoses and all orders for this visit:    1. Anxiety (Primary)  Comments:  Patient returns to the office today for recheck of anxiety. Patient feels that medication dosing needs increased. Will increase to 40 mg. Recheck in 1 month.  Orders:  -     FLUoxetine (PROzac) 40 MG capsule; Take 1 capsule by " mouth Daily for 90 days.  Dispense: 90 capsule; Refill: 0    2. Vaginal irritation  Patient voices she was recently seen in [facility] for vaginal and urinary symptoms. She has completed antibiotic treatment but is requesting vaginal swab today. Urine testing in office negative for UTI. She will be notified of results and further treatment if indicated.   Orders:  -     POC Urinalysis Dipstick  -     Urine Culture - Urine, Urine, Clean Catch  -     Genital Mycoplasmas BAO, Swab - Swab, Cervix  -     NuSwab VG+ - Swab, Cervix    3. HERMANN (stress urinary incontinence, female)  Comments:  Patient reports some HERMANN symptoms. Encouraged use of Kegel exercises, which she is doing. Recommended PFPT but she reports she does not have time for the referral due to work and home life.           An After Visit Summary was printed and given to the patient at discharge.  Return in about 4 weeks (around 4/24/2025) for Recheck. Sooner if problems arise.          Shira Bonds, APRN

## 2025-03-27 NOTE — PATIENT INSTRUCTIONS
Preventive Care 21-39 Years Old, Female  Preventive care refers to lifestyle choices and visits with your health care provider that can promote health and wellness. Preventive care visits are also called wellness exams.  What can I expect for my preventive care visit?  Counseling  During your preventive care visit, your health care provider may ask about your:  Medical history, including:  Past medical problems.  Family medical history.  Pregnancy history.  Current health, including:  Menstrual cycle.  Method of birth control.  Emotional well-being.  Home life and relationship well-being.  Sexual activity and sexual health.  Lifestyle, including:  Alcohol, nicotine or tobacco, and drug use.  Access to firearms.  Diet, exercise, and sleep habits.  Work and work environment.  Sunscreen use.  Safety issues such as seatbelt and bike helmet use.  Physical exam  Your health care provider may check your:  Height and weight. These may be used to calculate your BMI (body mass index). BMI is a measurement that tells if you are at a healthy weight.  Waist circumference. This measures the distance around your waistline. This measurement also tells if you are at a healthy weight and may help predict your risk of certain diseases, such as type 2 diabetes and high blood pressure.  Heart rate and blood pressure.  Body temperature.  Skin for abnormal spots.  What immunizations do I need?    Vaccines are usually given at various ages, according to a schedule. Your health care provider will recommend vaccines for you based on your age, medical history, and lifestyle or other factors, such as travel or where you work.  What tests do I need?  Screening  Your health care provider may recommend screening tests for certain conditions. This may include:  Pelvic exam and Pap test.  Lipid and cholesterol levels.  Diabetes screening. This is done by checking your blood sugar (glucose) after you have not eaten for a while (fasting).  Hepatitis  B test.  Hepatitis C test.  HIV (human immunodeficiency virus) test.  STI (sexually transmitted infection) testing, if you are at risk.  BRCA-related cancer screening. This may be done if you have a family history of breast, ovarian, tubal, or peritoneal cancers.  Talk with your health care provider about your test results, treatment options, and if necessary, the need for more tests.  Follow these instructions at home:  Eating and drinking    Eat a healthy diet that includes fresh fruits and vegetables, whole grains, lean protein, and low-fat dairy products.  Take vitamin and mineral supplements as recommended by your health care provider.  Do not drink alcohol if:  Your health care provider tells you not to drink.  You are pregnant, may be pregnant, or are planning to become pregnant.  If you drink alcohol:  Limit how much you have to 0-1 drink a day.  Know how much alcohol is in your drink. In the [place] one drink equals one 12 oz bottle of beer (355 mL), one 5 oz glass of wine (148 mL), or one 1½ oz glass of hard liquor (44 mL).  Lifestyle  Brush your teeth every morning and night with fluoride toothpaste. Floss one time each day.  Exercise for at least 30 minutes 5 or more days each week.  Do not use any products that contain nicotine or tobacco. These products include cigarettes, chewing tobacco, and vaping devices, such as e-cigarettes. If you need help quitting, ask your health care provider.  Do not use drugs.  If you are sexually active, practice safe sex. Use a condom or other form of protection to prevent STIs.  If you do not wish to become pregnant, use a form of birth control. If you plan to become pregnant, see your health care provider for a prepregnancy visit.  Find healthy ways to manage stress, such as:  Meditation, yoga, or listening to music.  Journaling.  Talking to a trusted person.  Spending time with friends and family.  Minimize exposure to UV radiation to reduce your risk of skin  cancer.  Safety  Always wear your seat belt while driving or riding in a vehicle.  Do not drive:  If you have been drinking alcohol. Do not ride with someone who has been drinking.  If you have been using any mind-altering substances or drugs.  While texting.  When you are tired or distracted.  Wear a helmet and other protective equipment during sports activities.  If you have firearms in your house, make sure you follow all gun safety procedures.  Seek help if you have been physically or sexually abused.  What's next?  Go to your health care provider once a year for an annual wellness visit.  Ask your health care provider how often you should have your eyes and teeth checked.  Stay up to date on all vaccines.  This information is not intended to replace advice given to you by your health care provider. Make sure you discuss any questions you have with your health care provider.  Document Revised: 06/15/2022 Document Reviewed: 06/15/2022  [employer] Patient Education © 2023 [employer]     Kegel Exercises    Kegel exercises can help strengthen your pelvic floor muscles. The pelvic floor is a group of muscles that support your rectum, small intestine, and bladder. In females, pelvic floor muscles also help support the uterus. These muscles help you control the flow of urine and stool (feces).  Kegel exercises are painless and simple. They do not require any equipment. Your provider may suggest Kegel exercises to:  Improve bladder and bowel control.  Improve sexual response.  Improve weak pelvic floor muscles after surgery to remove the uterus (hysterectomy) or after pregnancy, in females.  Improve weak pelvic floor muscles after prostate gland removal or surgery, in males.  Kegel exercises involve squeezing your pelvic floor muscles. These are the same muscles you squeeze when you try to stop the flow of urine or keep from passing gas. The exercises can be done while sitting, standing, or lying down, but it is best  to vary your position.  Ask your health care provider which exercises are safe for you. Do exercises exactly as told by your health care provider and adjust them as directed. Do not begin these exercises until told by your health care provider.  Exercises  How to do Kegel exercises:  Squeeze your pelvic floor muscles tight. You should feel a tight lift in your rectal area. If you are a female, you should also feel a tightness in your vaginal area. Keep your stomach, buttocks, and legs relaxed.  Hold the muscles tight for up to 10 seconds.  Breathe normally.  Relax your muscles for up to 10 seconds.  Repeat as told by your health care provider.  Repeat this exercise daily as told by your health care provider. Continue to do this exercise for at least 4-6 weeks, or for as long as told by your health care provider.  You may be referred to a physical therapist who can help you learn more about how to do Kegel exercises.  Depending on your condition, your health care provider may recommend:  Varying how long you squeeze your muscles.  Doing several sets of exercises every day.  Doing exercises for several weeks.  Making Kegel exercises a part of your regular exercise routine.  This information is not intended to replace advice given to you by your health care provider. Make sure you discuss any questions you have with your health care provider.  Document Revised: 04/28/2022 Document Reviewed: 04/28/2022  [employer] Patient Education © 2023 [employer]

## 2025-03-30 LAB
A VAGINAE DNA VAG QL NAA+PROBE: NORMAL SCORE
BACTERIA UR CULT: NORMAL
BACTERIA UR CULT: NORMAL
BVAB2 DNA VAG QL NAA+PROBE: NORMAL SCORE
C ALBICANS DNA VAG QL NAA+PROBE: NEGATIVE
C GLABRATA DNA VAG QL NAA+PROBE: NEGATIVE
C TRACH DNA SPEC QL NAA+PROBE: NEGATIVE
M GENITALIUM DNA SPEC QL NAA+PROBE: NEGATIVE
M HOMINIS DNA SPEC QL NAA+PROBE: NEGATIVE
MEGA1 DNA VAG QL NAA+PROBE: NORMAL SCORE
N GONORRHOEA DNA VAG QL NAA+PROBE: NEGATIVE
T VAGINALIS DNA VAG QL NAA+PROBE: NEGATIVE
UREAPLASMA DNA SPEC QL NAA+PROBE: POSITIVE

## 2025-03-31 DIAGNOSIS — A49.3 NONGONOCOCCAL URETHRITIS DUE TO UREAPLASMA UREALYTICUM: Primary | ICD-10-CM

## 2025-03-31 DIAGNOSIS — N34.1 NONGONOCOCCAL URETHRITIS DUE TO UREAPLASMA UREALYTICUM: Primary | ICD-10-CM

## 2025-03-31 RX ORDER — DOXYCYCLINE 100 MG/1
100 CAPSULE ORAL 2 TIMES DAILY
Qty: 20 CAPSULE | Refills: 0 | Status: SHIPPED | OUTPATIENT
Start: 2025-03-31 | End: 2025-04-10

## 2025-05-28 ENCOUNTER — OFFICE VISIT (OUTPATIENT)
Age: 25
End: 2025-05-28
Payer: COMMERCIAL

## 2025-05-28 VITALS
BODY MASS INDEX: 25.03 KG/M2 | SYSTOLIC BLOOD PRESSURE: 108 MMHG | DIASTOLIC BLOOD PRESSURE: 72 MMHG | WEIGHT: 169 LBS | HEIGHT: 69 IN

## 2025-05-28 DIAGNOSIS — F41.9 ANXIETY: ICD-10-CM

## 2025-05-28 DIAGNOSIS — N64.4 BREAST PAIN: Primary | ICD-10-CM

## 2025-05-28 PROCEDURE — 81025 URINE PREGNANCY TEST: CPT | Performed by: NURSE PRACTITIONER

## 2025-05-28 PROCEDURE — 99214 OFFICE O/P EST MOD 30 MIN: CPT | Performed by: NURSE PRACTITIONER

## 2025-05-28 NOTE — PROGRESS NOTES
"Chief Complaint  Breast Problem (Pt is here with c/o breast tenderness and leaking.  Pt stopped breastfeeding about 8 months ago, states she has not leaked since stopping until now. )  History of Present Illness  The patient presents for evaluation of breast leakage and anxiety.    She discontinued breastfeeding approximately 8 months ago. Approximately 1.5 weeks ago, she experienced a sensation akin to letdown, followed by the observation of a small spot on her bra. This occurred prior to the onset of her menstrual cycle. She has been experiencing persistent breast leakage for the past 4 to 5 days, which intensifies under pressure. She typically wears a push-up bra and does not report any issues with blood pressure. Her menstrual cycles are regular, occurring every 2 months, consistent with her pre-pregnancy pattern.    She is currently on Prozac, which she reports as ineffective in managing her anxiety. She has not yet undergone GeneSight testing. She also mentions that she forgot to refill her prescription. She was unable to attend a previous appointment due to her work schedule. Prior to Prozac, she was on BuSpar 5 mg, taken up to 3 times daily as needed, but found it unhelpful.        Subjective          Lisset Mckinnon presents to [facility]  History of Present Illness    Review of Systems   Constitutional:         Breast tenderness and leaking   Psychiatric/Behavioral:  The patient is nervous/anxious.          Objective   Vital Signs:   /72   Ht 175.3 cm (69\")   Wt 76.7 kg (169 lb)   BMI 24.96 kg/m²     Physical Exam  Vitals reviewed.   Constitutional:       Appearance: She is well-developed.   Eyes:      General:         Right eye: No discharge.         Left eye: No discharge.   Cardiovascular:      Rate and Rhythm: Normal rate and regular rhythm.   Pulmonary:      Effort: Pulmonary effort is normal.      Breath sounds: Normal breath sounds.   Skin:     General: Skin is " warm.   Neurological:      Mental Status: She is alert and oriented to person, place, and time.   Psychiatric:         Behavior: Behavior normal.         Thought Content: Thought content normal.         Judgment: Judgment normal.       Result Review :   The following data was reviewed by: SURYA Carney on 05/28/2025:  OBGYN Diagnostics Review:   Lab Results   Component Value Date    CASEREPORT  07/29/2024     Gynecologic Cytology Report                       Case: [id number]                                  Authorizing Provider:  Shira Bonds APRN      Collected:           07/29/2024 10:59 AM          Ordering Location:     [facility]     Received:            07/30/2024 06:44 AM                                 [facility]                                                                  First Screen:          Beverly Santoro                                                           Specimen:    Liquid-Based Pap, Screening, Cervix, Endocervix                                            INTERPGYN Negative for intraepithelial lesion or malignancy 07/29/2024    GENCAT Within normal limits 07/29/2024    SPECADGYN  07/29/2024     Satisfactory for evaluation, endocervical/transformation zone component present    ADDINFO  07/29/2024     Disclaimer: Cervical cytology is a screening test primarily for squamous cancer and its precursors and has associated false-negative and false-positive results.  Technologies such as liquid-based preparations may decrease but will not eliminate all false-negative results.  Follow-up of unexplained clinical signs and symptoms is recommended to minimize false-negative results. (The [place] System for Reporting Cervical Cytology: Flores, 2015).        HCG, Urine, QL   Date Value Ref Range Status   05/28/2025 Negative Negative Final                 Current Outpatient Medications on File Prior to Visit   Medication Sig   • FLUoxetine (PROzac) 40 MG capsule Take 1  capsule by mouth Daily for 90 days.   • ondansetron ODT (ZOFRAN-ODT) 4 MG disintegrating tablet DISSOLVE ONE TABLET BY MOUTH THREE TIMES A DAY AS NEEDED FOR NAUSEA OR VOMITING FOR 7 DAYS (Patient not taking: Reported on 5/28/2025)     No current facility-administered medications on file prior to visit.       Assessment & Plan  1. Breast leakage.  - Take Sudafed daily for a week, following the instructions on the packaging.  - Wear a tight-fitting sports bra all day.  - Avoid any form of breast stimulation.  - If leakage persists after 2 weeks without any breast manipulation, further evaluation will be considered.    2. Anxiety.  Prozac is not effective in managing anxiety. Previously tried BuSpar 5 mg up to three times a day without success.  - Conduct GeneSight test to identify the most effective medication for the condition.    Diagnoses and all orders for this visit:    1. Breast pain (Primary)  -     POC Pregnancy, Urine    2. Anxiety          BMI is within normal parameters. No other follow-up for BMI required.       Follow Up   Return in about 2 weeks (around 6/11/2025) for Recheck.    Patient was given instructions and counseling regarding her condition or for health maintenance advice. Please see specific information pulled into the AVS if appropriate.       Patient or patient representative verbalized consent for the use of Ambient Listening during the visit with  SURYA Carney for chart documentation. 6/2/2025  14:53 CDT

## 2025-06-03 NOTE — PATIENT INSTRUCTIONS

## 2025-06-19 ENCOUNTER — OFFICE VISIT (OUTPATIENT)
Age: 25
End: 2025-06-19
Payer: COMMERCIAL

## 2025-06-19 VITALS
WEIGHT: 171 LBS | HEIGHT: 69 IN | BODY MASS INDEX: 25.33 KG/M2 | SYSTOLIC BLOOD PRESSURE: 108 MMHG | DIASTOLIC BLOOD PRESSURE: 60 MMHG

## 2025-06-19 DIAGNOSIS — N64.4 BREAST PAIN: Primary | ICD-10-CM

## 2025-06-19 DIAGNOSIS — F41.9 ANXIETY: ICD-10-CM

## 2025-06-19 RX ORDER — BUPROPION HYDROCHLORIDE 150 MG/1
150 TABLET ORAL EVERY MORNING
Qty: 30 TABLET | Refills: 2 | Status: SHIPPED | OUTPATIENT
Start: 2025-06-19

## 2025-06-19 RX ORDER — NORELGESTROMIN AND ETHINYL ESTRADIOL 35; 150 UG/MG; UG/MG
1 PATCH TRANSDERMAL WEEKLY
Qty: 3 PATCH | Refills: 1 | Status: SHIPPED | OUTPATIENT
Start: 2025-06-19

## 2025-06-19 NOTE — PROGRESS NOTES
Chief Complaint  Breast Problem (Pt is here for a two week f/u on breast pain and leaking.  Also to discuss genesight results from 6/2/25.  Pt states that she is still having the breast pain and leaking.  )  History of Present Illness  The patient presents for evaluation of nipple discharge and mood disorder.    She continues to experience nipple discharge, although the volume remains uncertain. She describes a sensation akin to milk letdown, accompanied by intermittent redness of the nipples. The frequency of wetness in her nipples has not changed. She reports no associated breast pain but notes an unusual sensation. 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 She wears tight-fitting bras at home and during workouts. She consumes alcohol infrequently. She has been taking Benadryl for allergies, as Sudafed induces drowsiness. She is currently menstruating. She has no history of heart attack, stroke, or blood clots. She has a history of migraine headaches without aura and no irregular heartbeats. She is considering the use of an intrauterine device (IUD) for birth control. She recalls feeling unwell when previously on birth control pills due to hormonal fluctuations.    She expresses dissatisfaction with the efficacy of Prozac and is contemplating a change in medication. She has previously tried BuSpar, which was ineffective, and Zoloft, which resulted in sleep paralysis. She also had an adverse reaction to Celexa. She reports difficulty waking up to alarms and poor concentration due to anxiety.    GYNECOLOGICAL HISTORY:  - Last menstrual period: Currently menstruating    CONTRACEPTION:  Considering intrauterine device (IUD) for birth control. Previous use of birth control pills resulted in feeling unwell due to hormonal fluctuations.        Subjective      "     Lisset Mckinnon presents to [facility]  History of Present Illness    Review of Systems   Constitutional:         Nipple discharge     Psychiatric/Behavioral:  The patient is nervous/anxious.          Objective   Vital Signs:   /60   Ht 175.3 cm (69\")   Wt 77.6 kg (171 lb)   BMI 25.25 kg/m²     Physical Exam  Exam conducted with a chaperone present.   Pulmonary:      Effort: Pulmonary effort is normal.   Neurological:      Mental Status: She is alert and oriented to person, place, and time.       Physical Exam  Breast: Bilateral nipple discharge noted. No masses or tenderness reported.    Result Review :   The following data was reviewed by: SURYA Carney on 06/19/2025:  OBGYN Diagnostics Review:   Lab Results   Component Value Date    CASEREPORT  07/29/2024     Gynecologic Cytology Report                       Case: [id number]                                  Authorizing Provider:  Shira Bonds APRN      Collected:           07/29/2024 10:59 AM          Ordering Location:     [facility]     Received:            07/30/2024 06:44 AM                                 [facility]                                                                  First Screen:          Beverly Santoro                                                           Specimen:    Liquid-Based Pap, Screening, Cervix, Endocervix                                            INTERPGYN Negative for intraepithelial lesion or malignancy 07/29/2024    GENCAT Within normal limits 07/29/2024    SPECADGYN  07/29/2024     Satisfactory for evaluation, endocervical/transformation zone component present    ADDINFO  07/29/2024     Disclaimer: Cervical cytology is a screening test primarily for squamous cancer and its precursors and has associated false-negative and false-positive results.  Technologies such as liquid-based preparations may decrease but will not eliminate all false-negative results. "  Follow-up of unexplained clinical signs and symptoms is recommended to minimize false-negative results. (The [place] System for Reporting Cervical Cytology: Flores, 2015).        HCG, Urine, QL   Date Value Ref Range Status   06/19/2025 Negative Negative Final           Current Outpatient Medications on File Prior to Visit   Medication Sig    FLUoxetine (PROzac) 40 MG capsule Take 1 capsule by mouth Daily for 90 days.    ondansetron ODT (ZOFRAN-ODT) 4 MG disintegrating tablet DISSOLVE ONE TABLET BY MOUTH THREE TIMES A DAY AS NEEDED FOR NAUSEA OR VOMITING FOR 7 DAYS (Patient not taking: Reported on 6/19/2025)     No current facility-administered medications on file prior to visit.     Assessment & Plan  1. Nipple discharge.  Symptoms of nipple discharge and letdown sensation are not indicative of a serious medical condition but are causing significant discomfort. The goal is to prevent the development of mastitis. It is hypothesized that lactation has not fully ceased. A pregnancy test was conducted and returned negative results. The potential benefits of birth control to regulate hormones and potentially halt lactation were discussed. Advised to wear tight-fitting sports bras and avoid warm/hot water over with breasts. A prescription for a 3-week course of birth control patches was provided, with instructions to apply the first patch on Sunday and replace it weekly. Informed about the possibility of irregular bleeding during this period. If symptoms improve early in the treatment, continue the patches for 3 weeks. If improvement is only noticed towards the end of the treatment, an additional 3-week course is recommended.    2. Mood disorder.  The GeneSight test results were reviewed. The potential side effects of Wellbutrin, including anxiety, were discussed. Advised to take Wellbutrin before 10 AM to avoid potential sleep disturbances. A prescription for Wellbutrin was provided with instructions to start the  medication today. Advised not to start both the birth control patch and Wellbutrin on the same day to identify any adverse effects accurately.    Follow-up  Follow up in 1 month.    Diagnoses and all orders for this visit:    1. Breast pain (Primary)  -     POC Pregnancy, Urine    2. Anxiety    Other orders  -     norelgestromin-ethinyl estradiol (ORTHO EVRA) 150-35 MCG/24HR; Place 1 patch on the skin as directed by provider 1 (One) Time Per Week.  Dispense: 3 patch; Refill: 1  -     buPROPion XL (Wellbutrin XL) 150 MG 24 hr tablet; Take 1 tablet by mouth Every Morning.  Dispense: 30 tablet; Refill: 2          BMI is >= 25 and <30. (Overweight) The following options were offered after discussion;: information on healthy weight added to patient's after visit summary        Follow Up   Return in about 4 weeks (around 7/17/2025) for med check.    Patient was given instructions and counseling regarding her condition or for health maintenance advice. Please see specific information pulled into the AVS if appropriate.       Patient or patient representative verbalized consent for the use of Ambient Listening during the visit with  SURYA Carney for chart documentation. 7/1/2025  21:48 CDT

## 2025-07-02 NOTE — PATIENT INSTRUCTIONS

## 2025-07-30 ENCOUNTER — OFFICE VISIT (OUTPATIENT)
Age: 25
End: 2025-07-30
Payer: COMMERCIAL

## 2025-07-30 VITALS
BODY MASS INDEX: 25.77 KG/M2 | HEIGHT: 69 IN | SYSTOLIC BLOOD PRESSURE: 106 MMHG | WEIGHT: 174 LBS | DIASTOLIC BLOOD PRESSURE: 70 MMHG

## 2025-07-30 DIAGNOSIS — R10.2 PELVIC PAIN: ICD-10-CM

## 2025-07-30 DIAGNOSIS — N92.6 MISSED PERIOD: ICD-10-CM

## 2025-07-30 DIAGNOSIS — F41.9 ANXIETY: ICD-10-CM

## 2025-07-30 DIAGNOSIS — N89.8 VAGINAL ITCHING: ICD-10-CM

## 2025-07-30 DIAGNOSIS — N39.3 SUI (STRESS URINARY INCONTINENCE, FEMALE): ICD-10-CM

## 2025-07-30 DIAGNOSIS — Z01.419 WELL WOMAN EXAM WITH ROUTINE GYNECOLOGICAL EXAM: Primary | ICD-10-CM

## 2025-07-30 DIAGNOSIS — Z12.4 ENCOUNTER FOR SCREENING FOR CERVICAL CANCER: ICD-10-CM

## 2025-07-30 DIAGNOSIS — R30.0 DYSURIA: ICD-10-CM

## 2025-07-30 PROCEDURE — G0123 SCREEN CERV/VAG THIN LAYER: HCPCS

## 2025-07-30 PROCEDURE — 87591 N.GONORRHOEAE DNA AMP PROB: CPT

## 2025-07-30 PROCEDURE — 87491 CHLMYD TRACH DNA AMP PROBE: CPT

## 2025-07-30 RX ORDER — NORELGESTROMIN AND ETHINYL ESTRADIOL 35; 150 UG/MG; UG/MG
1 PATCH TRANSDERMAL
COMMUNITY
End: 2025-07-30

## 2025-07-30 RX ORDER — BUPROPION HYDROCHLORIDE 150 MG/1
150 TABLET ORAL EVERY MORNING
COMMUNITY
End: 2025-07-30

## 2025-07-30 NOTE — PROGRESS NOTES
"Subjective     Lisset Mckinnon is a 24 y.o. female    History of Present Illness  The patient presents to [facility] office today for completion of her wwe. She voices concerns regarding HERMANN, dysuria, and recent reaction to wellbutrin prescribed to treat her anxiety.   Gynecologic Exam  The patient's primary symptoms include genital itching, missed menses and pelvic pain. The patient's pertinent negatives include no genital lesions, genital odor, genital rash, vaginal bleeding or vaginal discharge. This is a recurrent problem. The current episode started 1 to 4 weeks ago. The problem occurs every several days. The problem has been comes and goes. The problem affects the right sided side. Associated symptoms include dysuria. Pertinent negatives include no abdominal pain, anorexia, back pain, chills, constipation, diarrhea, discolored urine, fever, flank pain, frequency, headaches, hematuria, joint pain, joint swelling, nausea, painful intercourse, rash, sore throat, urgency or vomiting. She is sexually active. She uses nothing for contraception. Her menstrual history has been irregular. The maximum temperature recorded prior to her arrival was no fever.         /70   Ht 175.3 cm (69\")   Wt 78.9 kg (174 lb)   LMP 06/16/2025 (Exact Date)   BMI 25.70 kg/m²     Outpatient Encounter Medications as of 7/30/2025   Medication Sig Dispense Refill    [DISCONTINUED] buPROPion XL (WELLBUTRIN XL) 150 MG 24 hr tablet Take 1 tablet by mouth Every Morning.      FLUoxetine (PROzac) 20 MG capsule Take 1 capsule by mouth Daily for 90 days. 90 capsule 1    [DISCONTINUED] buPROPion XL (Wellbutrin XL) 150 MG 24 hr tablet Take 1 tablet by mouth Every Morning. 30 tablet 2    [DISCONTINUED] FLUoxetine (PROzac) 40 MG capsule Take 1 capsule by mouth Daily for 90 days. 90 capsule 0    [DISCONTINUED] norelgestromin-ethinyl estradiol (ORTHO EVRA) 150-35 MCG/24HR Place 1 patch on the skin as directed by provider 1 (One) Time Per Week. " 3 patch 1    [DISCONTINUED] norelgestromin-ethinyl estradiol (ORTHO EVRA) 150-35 MCG/24HR 1 patch Every 7 (Seven) Days.      [DISCONTINUED] ondansetron ODT (ZOFRAN-ODT) 4 MG disintegrating tablet DISSOLVE ONE TABLET BY MOUTH THREE TIMES A DAY AS NEEDED FOR NAUSEA OR VOMITING FOR 7 DAYS (Patient not taking: Reported on 6/19/2025)       No facility-administered encounter medications on file as of 7/30/2025.       Past Medical History  Past Medical History:   Diagnosis Date    Anxiety     Depression     Fibroid     Multiple gestation     Ovarian cyst     Urinary tract infection     Varicella        Surgical History  Past Surgical History:   Procedure Laterality Date    WISDOM TOOTH EXTRACTION         Family History  Family History   Problem Relation Age of Onset    No Known Problems Father     Thyroid disease Mother     Hypothyroidism Mother     Cancer Brother     Skin cancer Brother     No Known Problems Sister     No Known Problems Sister     Breast cancer Neg Hx     Ovarian cancer Neg Hx     Uterine cancer Neg Hx     Colon cancer Neg Hx     Melanoma Neg Hx        The following portions of the patient's history were reviewed and updated as appropriate: allergies, current medications, past family history, past medical history, past social history, past surgical history, and problem list.    Review of Systems   Constitutional: Negative.  Negative for chills and fever.   HENT: Negative.  Negative for sore throat.    Eyes: Negative.    Respiratory: Negative.     Cardiovascular: Negative.    Gastrointestinal: Negative.  Negative for abdominal pain, anorexia, constipation, diarrhea, nausea and vomiting.   Endocrine: Negative.    Genitourinary:  Positive for dysuria, missed menses and pelvic pain. Negative for breast discharge, breast lump, breast pain, flank pain, frequency, hematuria, urgency and vaginal discharge.   Musculoskeletal: Negative.  Negative for back pain and joint pain.   Skin: Negative.  Negative for rash.    Allergic/Immunologic: Negative.    Neurological: Negative.    Hematological: Negative.    Psychiatric/Behavioral: Negative.         Objective   Physical Exam  Vitals and nursing note reviewed. Exam conducted with a chaperone present (Jayda Dang ma).   Constitutional:       General: She is not in acute distress.     Appearance: She is well-developed. She is not diaphoretic.   HENT:      Head: Normocephalic.      Right Ear: External ear normal.      Left Ear: External ear normal.      Nose: Nose normal.   Eyes:      General: No scleral icterus.        Right eye: No discharge.         Left eye: No discharge.      Conjunctiva/sclera: Conjunctivae normal.      Pupils: Pupils are equal, round, and reactive to light.   Neck:      Thyroid: No thyromegaly.      Vascular: No carotid bruit.      Trachea: No tracheal deviation.   Cardiovascular:      Rate and Rhythm: Normal rate and regular rhythm.      Pulses: Normal pulses.      Heart sounds: Normal heart sounds. No murmur heard.  Pulmonary:      Effort: Pulmonary effort is normal. No respiratory distress.      Breath sounds: Normal breath sounds. No wheezing.   Chest:   Breasts:     Breasts are symmetrical.      Right: Normal. No swelling, bleeding, inverted nipple, mass, nipple discharge, skin change or tenderness.      Left: Normal. No swelling, bleeding, inverted nipple, mass, nipple discharge, skin change or tenderness.   Abdominal:      General: Bowel sounds are normal. There is no distension.      Palpations: Abdomen is soft. There is no mass.      Tenderness: There is no abdominal tenderness. There is no right CVA tenderness, left CVA tenderness or guarding.      Hernia: No hernia is present. There is no hernia in the left inguinal area or right inguinal area.   Genitourinary:     General: Normal vulva.      Exam position: Lithotomy position.      Labia:         Right: No rash, tenderness, lesion or injury.         Left: No rash, tenderness, lesion or injury.        Vagina: Normal. No signs of injury and foreign body. No vaginal discharge, erythema, tenderness or bleeding.      Cervix: Normal.      Uterus: Normal. Not enlarged, not fixed and not tender.       Adnexa: Right adnexa normal and left adnexa normal.        Right: No mass, tenderness or fullness.          Left: No mass, tenderness or fullness.        Rectum: Normal. No mass.      Comments:   BSU normal  Urethral meatus  Normal  Perineum  Normal  Musculoskeletal:         General: No tenderness. Normal range of motion.      Cervical back: Normal range of motion and neck supple.   Lymphadenopathy:      Head:      Right side of head: No submental, submandibular, tonsillar, preauricular, posterior auricular or occipital adenopathy.      Left side of head: No submental, submandibular, tonsillar, preauricular, posterior auricular or occipital adenopathy.      Cervical: No cervical adenopathy.      Right cervical: No superficial, deep or posterior cervical adenopathy.     Left cervical: No superficial, deep or posterior cervical adenopathy.      Upper Body:      Right upper body: No supraclavicular, axillary or pectoral adenopathy.      Left upper body: No supraclavicular, axillary or pectoral adenopathy.      Lower Body: No right inguinal adenopathy. No left inguinal adenopathy.   Skin:     General: Skin is warm and dry.      Findings: No bruising, erythema or rash.   Neurological:      Mental Status: She is alert and oriented to person, place, and time.      Coordination: Coordination normal.   Psychiatric:         Mood and Affect: Mood normal.         Behavior: Behavior normal.         Thought Content: Thought content normal.         Judgment: Judgment normal.         PHQ-9 Depression Screening  Little interest or pleasure in doing things? Not at all   Feeling down, depressed, or hopeless? Not at all   PHQ-2 Total Score 0   Trouble falling or staying asleep, or sleeping too much?     Feeling tired or having little energy?      Poor appetite or overeating?     Feeling bad about yourself - or that you are a failure or have let yourself or your family down?     Trouble concentrating on things, such as reading the newspaper or watching television?     Moving or speaking so slowly that other people could have noticed? Or the opposite - being so fidgety or restless that you have been moving around a lot more than usual?     Thoughts that you would be better off dead, or of hurting yourself in some way?     PHQ-9 Total Score     If you checked off any problems, how difficult have these problems made it for you to do your work, take care of things at home, or get along with other people?          Assessment & Plan   Diagnoses and all orders for this visit:    1. Well woman exam with routine gynecological exam (Primary)  Comments:  The patient present to the office today for her wwe. She is not established with pcp and labs are completed as needed.    2. Encounter for screening for cervical cancer  Comments:  Last pap smear/exam 7/19/24, normal. ASCCP guidelines discussed. Patient desires screening pap smear. She is sexually active, g/c testing added. No evidence of prolapse noted. Normal pelvic exam today.   Orders:  -     Liquid-based Pap Smear, Screening    3. Missed period  Comments:  LMP 6/16/2025, upt negative in office today.  Orders:  -     POC Pregnancy, Urine    4. Anxiety  Comments:  She tried use of Wellbutrin xl for two doses and experienced shortness of breath. She has since stopped use of the medication. She wishes to restart fluoxetine as she has taken this before and tolerated well. Will send in new prescription for this.   Orders:  -     FLUoxetine (PROzac) 20 MG capsule; Take 1 capsule by mouth Daily for 90 days.  Dispense: 90 capsule; Refill: 1    5. Dysuria  Comments:  Patient reports dysuria occurring a few weeks ago. She tried AZO and increasing hydration with symptoms subsiding. Symptoms have now returned with feelings  of right sided pelvic discomfort. Urine will be sent for testing along with vaginal swab collection today. She will be notified of results and treatment if indicated.  Orders:  -     UA / M With / Rflx Culture([facility] ONLY) - Urine, Clean Catch  -     NuSwab Vaginitis (VG) - Swab, Cervix    6. Vaginal itching  -     NuSwab Vaginitis (VG) - Swab, Cervix    7. HERMANN (stress urinary incontinence, female)  Comments:  She continues to experiene hermann with activities despite kegel exercises. Will place PFPT referral for [facility].  Orders:  -     Ambulatory Referral to Physical Therapy for Evaluation & Treatment    8. Pelvic pain  Comments:  Intermittent right sided pelvic pain with accompanied urinary symptoms. Urine testing will be completed today along with vaginal swab.         Normal GYN exam. Encouraged SBE, pt is aware how to do self breast exam and the importance of same. Discussed weight management and importance of maintaining a healthy weight. Discussed Vitamin D intake and the importance of adequate vitamin D for both bone health and a healthy immune system.  Discussed daily exercise and the importance of same, in regards to a healthy heart as well as helping to maintain her weight and improving her mental health.  Colonoscopy is not indicated. Mammogram is not indicated. Bone density is not indicated. Pap smear is done per ASCCP guidelines. HPV is not done. Lab work up is up to date.             Shira Bonds, SURYA  7/30/2025

## 2025-07-31 ENCOUNTER — RESULTS FOLLOW-UP (OUTPATIENT)
Age: 25
End: 2025-07-31
Payer: COMMERCIAL

## 2025-07-31 LAB
GEN CATEG CVX/VAG CYTO-IMP: NORMAL
LAB AP CASE REPORT: NORMAL
LAB AP GYN ADDITIONAL INFORMATION: NORMAL
LAB AP GYN OTHER FINDINGS: NORMAL
Lab: NORMAL
PATH INTERP SPEC-IMP: NORMAL
STAT OF ADQ CVX/VAG CYTO-IMP: NORMAL

## 2025-08-01 DIAGNOSIS — A59.9 TRICHOMONAS VAGINALIS INFECTION: Primary | ICD-10-CM

## 2025-08-01 LAB
A VAGINAE DNA VAG QL NAA+PROBE: ABNORMAL SCORE
BVAB2 DNA VAG QL NAA+PROBE: ABNORMAL SCORE
C ALBICANS DNA VAG QL NAA+PROBE: NEGATIVE
C GLABRATA DNA VAG QL NAA+PROBE: NEGATIVE
C TRACH RRNA SPEC QL NAA+PROBE: NOT DETECTED
GLUCOSE UR QL STRIP: NORMAL
KETONES UR QL STRIP: NORMAL
MEGA1 DNA VAG QL NAA+PROBE: ABNORMAL SCORE
N GONORRHOEA RRNA SPEC QL NAA+PROBE: NOT DETECTED
PH UR STRIP: NORMAL [PH]
PROT UR QL STRIP: NORMAL
SP GR UR STRIP: NORMAL
SPECIMEN STATUS: NORMAL
T VAGINALIS DNA VAG QL NAA+PROBE: POSITIVE

## 2025-08-01 RX ORDER — METRONIDAZOLE 500 MG/1
500 TABLET ORAL 2 TIMES DAILY
Qty: 14 TABLET | Refills: 0 | Status: SHIPPED | OUTPATIENT
Start: 2025-08-01 | End: 2025-08-08

## 2025-08-01 NOTE — TELEPHONE ENCOUNTER
Pt returned phone call and was informed of positive for trichomonas and rx sent to pharmacy. Pt also advised of pap smear normal G/C negative and UPT negative. Pt also informed to return to office for recollection of urine for culture. Pt voices understanding.

## 2025-08-01 NOTE — TELEPHONE ENCOUNTER
Received phone call from [email or web address] reporting urine specimen was sent in incorrect container and is unable to be processed at this time. Attempted to reach pt to inform new specimen will need to be collected and sent with no response. Left Vm to return call.

## 2025-08-06 DIAGNOSIS — R30.0 DYSURIA: Primary | ICD-10-CM

## 2025-08-11 LAB
APPEARANCE UR: ABNORMAL
BACTERIA #/AREA URNS HPF: ABNORMAL /[HPF]
BACTERIA UR CULT: ABNORMAL
BACTERIA UR CULT: ABNORMAL
BILIRUB UR QL STRIP: NEGATIVE
CASTS URNS QL MICRO: ABNORMAL /LPF
COLOR UR: YELLOW
EPI CELLS #/AREA URNS HPF: >10 /HPF (ref 0–10)
GLUCOSE UR QL STRIP: NEGATIVE
HGB UR QL STRIP: NEGATIVE
KETONES UR QL STRIP: NEGATIVE
LEUKOCYTE ESTERASE UR QL STRIP: ABNORMAL
MICRO URNS: ABNORMAL
MUCOUS THREADS URNS QL MICRO: PRESENT
NITRITE UR QL STRIP: NEGATIVE
PH UR STRIP: 8 [PH] (ref 5–7.5)
PROT UR QL STRIP: ABNORMAL
RBC #/AREA URNS HPF: ABNORMAL /HPF (ref 0–2)
SP GR UR STRIP: 1.03 (ref 1–1.03)
URINALYSIS REFLEX: ABNORMAL
UROBILINOGEN UR STRIP-MCNC: 1 MG/DL (ref 0.2–1)
WBC #/AREA URNS HPF: ABNORMAL /HPF (ref 0–5)

## 2025-08-12 DIAGNOSIS — N39.0 URINARY TRACT INFECTION WITHOUT HEMATURIA, SITE UNSPECIFIED: Primary | ICD-10-CM

## 2025-08-12 RX ORDER — NITROFURANTOIN 25; 75 MG/1; MG/1
100 CAPSULE ORAL 2 TIMES DAILY
Qty: 14 CAPSULE | Refills: 0 | Status: SHIPPED | OUTPATIENT
Start: 2025-08-12 | End: 2025-08-19

## 2025-09-04 ENCOUNTER — OFFICE VISIT (OUTPATIENT)
Dept: OBGYN CLINIC | Age: 25
End: 2025-09-04
Payer: COMMERCIAL

## 2025-09-04 ENCOUNTER — RESULTS FOLLOW-UP (OUTPATIENT)
Dept: OBGYN CLINIC | Age: 25
End: 2025-09-04

## 2025-09-04 ENCOUNTER — TELEPHONE (OUTPATIENT)
Dept: OBGYN CLINIC | Age: 25
End: 2025-09-04

## 2025-09-04 VITALS
SYSTOLIC BLOOD PRESSURE: 107 MMHG | HEART RATE: 103 BPM | BODY MASS INDEX: 21.86 KG/M2 | DIASTOLIC BLOOD PRESSURE: 72 MMHG | WEIGHT: 148 LBS

## 2025-09-04 DIAGNOSIS — Z32.00 POSSIBLE PREGNANCY, NOT CONFIRMED: ICD-10-CM

## 2025-09-04 DIAGNOSIS — Z32.01 POSITIVE URINE PREGNANCY TEST: ICD-10-CM

## 2025-09-04 DIAGNOSIS — N91.2 AMENORRHEA: Primary | ICD-10-CM

## 2025-09-04 PROBLEM — Z3A.39 39 WEEKS GESTATION OF PREGNANCY: Status: RESOLVED | Noted: 2021-06-06 | Resolved: 2025-09-04

## 2025-09-04 PROBLEM — R10.9 CRAMPING AFFECTING PREGNANCY, ANTEPARTUM: Status: RESOLVED | Noted: 2021-03-24 | Resolved: 2025-09-04

## 2025-09-04 PROBLEM — O26.899 CRAMPING AFFECTING PREGNANCY, ANTEPARTUM: Status: RESOLVED | Noted: 2021-03-24 | Resolved: 2025-09-04

## 2025-09-04 PROBLEM — Z3A.38 PREGNANCY WITH 38 COMPLETED WEEKS GESTATION: Status: RESOLVED | Noted: 2021-06-05 | Resolved: 2025-09-04

## 2025-09-04 PROBLEM — O47.9 UTERINE CONTRACTIONS DURING PREGNANCY: Status: RESOLVED | Noted: 2021-05-28 | Resolved: 2025-09-04

## 2025-09-04 PROCEDURE — 99213 OFFICE O/P EST LOW 20 MIN: CPT | Performed by: ADVANCED PRACTICE MIDWIFE

## 2025-09-04 RX ORDER — BUPROPION HYDROCHLORIDE 150 MG/1
150 TABLET ORAL EVERY MORNING
COMMUNITY
End: 2025-09-04